# Patient Record
Sex: FEMALE | Race: WHITE | Employment: OTHER | URBAN - METROPOLITAN AREA
[De-identification: names, ages, dates, MRNs, and addresses within clinical notes are randomized per-mention and may not be internally consistent; named-entity substitution may affect disease eponyms.]

---

## 2021-03-31 ENCOUNTER — OFFICE VISIT (OUTPATIENT)
Dept: SURGERY | Age: 66
End: 2021-03-31
Payer: COMMERCIAL

## 2021-03-31 VITALS
SYSTOLIC BLOOD PRESSURE: 147 MMHG | WEIGHT: 227.22 LBS | DIASTOLIC BLOOD PRESSURE: 83 MMHG | OXYGEN SATURATION: 99 % | BODY MASS INDEX: 38.79 KG/M2 | HEART RATE: 70 BPM | HEIGHT: 64 IN

## 2021-03-31 DIAGNOSIS — E88.1: Primary | ICD-10-CM

## 2021-03-31 PROCEDURE — 99203 OFFICE O/P NEW LOW 30 MIN: CPT | Performed by: PLASTIC SURGERY

## 2021-03-31 RX ORDER — ROSUVASTATIN CALCIUM 10 MG/1
10 TABLET, COATED ORAL DAILY
COMMUNITY
Start: 2021-03-08

## 2021-03-31 RX ORDER — ASPIRIN 81 MG/1
81 TABLET ORAL DAILY
COMMUNITY

## 2021-03-31 NOTE — PROGRESS NOTES
History    Not on file   Social Needs    Financial resource strain: Not on file    Food insecurity     Worry: Not on file     Inability: Not on file    Transportation needs     Medical: Not on file     Non-medical: Not on file   Tobacco Use    Smoking status: Never Smoker    Smokeless tobacco: Never Used   Substance and Sexual Activity    Alcohol use: Not on file    Drug use: Not on file    Sexual activity: Not on file   Lifestyle    Physical activity     Days per week: Not on file     Minutes per session: Not on file    Stress: Not on file   Relationships    Social connections     Talks on phone: Not on file     Gets together: Not on file     Attends [employer] service: Not on file     Active member of club or organization: Not on file     Attends meetings of clubs or organizations: Not on file     Relationship status: Not on file    Intimate partner violence     Fear of current or ex partner: Not on file     Emotionally abused: Not on file     Physically abused: Not on file     Forced sexual activity: Not on file   Other Topics Concern    Not on file   Social History Narrative    Not on file     No family history on file. Physical Exam:   BP (!) 147/83   Pulse 70   Ht 5' 4\" (1.626 m)   Wt 227 lb 3.5 oz (103.1 kg)   SpO2 99%   BMI 39.00 kg/m²    Body mass index is 39 kg/m². Physical Exam   Nursing note and vitals reviewed. Constitutional: Oriented to person, place, and time. Appears well-developed and well-nourished. No distress. Head: Normocephalic and atraumatic. Eyes: Conjunctivae and EOM are normal.   Pulmonary/Chest: Effort normal. No respiratory distress. Neurological: Alert and oriented to person, place, and time. Skin: Excess skin and soft tissue over the mons pubis. Psychiatric: Normal mood and affect. Behavior is normal    Imaging:   [unfilled]        Impression/Plan:      Diagnosis Orders   1.  Cutaneous lipodystrophy       There is no problem list on file for this patient. Plan:  Patient with lipodystrophy versus edema mons pubis. The risk of mons plasty was discussed with patient detail. All questions were answered. ASPS consent mons pubis lift    GENERAL INFORMATION  Mons pubis lift is a surgical procedure to remove excess skin and fatty tissue from the mons pubis. Obese individuals who intend to lose weight should postpone all forms of body contouring surgery until they have reached a stable weight. ALTERNATIVE TREATMENTS  Alternative forms of management consist of not treating the areas of loose skin and fatty deposits. Liposuction may be a surgical alternative to if there is good skin tone and localized mons pubis fatty tissue. If you have lost your skin laxity as apparent by multiple stretch marks you will not be a good liposuction candidate. Diet and exercise programs may be of benefit in the overall reduction of excess body fat and contour improvement. Risks and potential complications are associated with alternative surgical forms of treatment. RISKS OF mons pubis lift SURGERY  Every surgical procedure involves a certain amount of risk and it is important that you understand these risks and the possible complications associated with them. In addition, every procedure has limitations. An individual's choice to undergo a surgical procedure is based on the comparison of the risk to potential benefit. Although the majority of patients do not experience these complications, you should discuss each of them with your plastic surgeon to make sure you completely understand all possible consequences of an abdominoplasty/panniculectomy. Bleeding- It is possible, though unusual, to experience a bleeding episode during or after surgery. Should post-operative bleeding occur, it may require an emergency treatment to drain the accumulated blood or blood transfusion. Intra-operative blood transfusions may be required.   Do not take any aspirin or anti-inflammatory medications for ten days before surgery, as this may increase the risk of bleeding. Non-prescription herbs and dietary supplements can increase the risk of surgical bleeding. Hematoma can occur at any time following injury. If blood transfusions are needed to treat blood loss, there is a risk of blood related infections such as hepatitis and the HIV (AIDS). Heparin medications that are used to prevent blood clots in veins can produce bleeding and decreased blood platelets. Infection - Infection is can occur after this surgery. Should an infection occur, treatment including antibiotics, hospitalization, or additional surgery may be necessary. There is a greater risk of infection when body contouring procedures are performed in conjunction with abdominal surgical procedures. Change in Skin Sensation- It is common to experience diminished (or loss) of skin sensation in areas that have had surgery. Diminished (or complete loss of skin sensation) may not totally resolve after an abdominoplasty/pannicular     Skin Contour Irregularities- Contour and shape irregularities and depressions may occur after mons pubis lift. Visible and palpable wrinkling of skin can occur. Residual skin irregularities at the ends of the incisions or dog ears are always a possibility as is skin pleating when there is excessive redundant skin. This may improve with time, or it can be surgically corrected. Major Wound Separation- Wounds may separate after surgery. Should this occur, additional treatment including surgery may be necessary. Skin Discoloration / Swelling- Bruising and swelling normally occurs following mons lift. The skin in or near the surgical site can appear either lighter or darker than surrounding skin. Although uncommon, swelling and skin discoloration may persist for long periods of time and, in rare situations, may be permanent.       Skin Sensitivity- Itching, tenderness, or exaggerated responses to hot or cold temperatures may occur after surgery. Usually this resolve during healing, but in some situations it may be chronic. Sutures- Most surgical techniques use deep sutures. You may notice these sutures after your surgery. Sutures may spontaneously poke through the skin, become visible or produce irritation that requires removal.    Damage to Deeper Structures- There is the potential for injury to deeper structures including nerves, blood vessels, muscles, and lungs (pneumothorax), or intra-abdominal injury can occure during any surgical procedure. The potential for this to occur varies according to the type of procedure being performed. Injury to deeper structures may be temporary or permanent. Fat Necrosis- Fatty tissue found deep in the skin might die. This may produce areas of firmness within the skin. Additional surgery to remove areas of fat necrosis may be necessary. There is the possibility of contour irregularities in the skin that may result from fat necrosis. Obesity: If you're BMI is greater than 30 you may have a higher chance of complications. This may include but not limited to wound healing and infections. Also, if you have other medical problems such as diabetes and hypertension it may affect your healing as well as your surgical results in bleeding. Pubic Distortion- There will be distortion of their labia and pubic area. Additional treatment including surgery may be necessary. Even with additional surgery she may never have symmetry. At times you may have painful intercourse. Scarring- All surgery leaves scars, some more visible than others. This surgery will leave large scars. Abnormal scars may occur within the skin and deeper tissues depending on your healing. Scars may be unattractive and of different color than surrounding skin.   Scar appearance may also vary within the same scar, exhibit contour variations or \"bunching\" due to the amount of excess skin. Scars may be asymmetrical (appear different between right and left side of the body). There is the possibility of visible marks in the skin from sutures. In some cases, scars may require surgical revision or treatment. Surgical Anesthesia- Both local and general anesthesia involve risk. There is the possibility of complications, injury, and even death from all forms of surgical anesthesia or sedation    Asymmetry- Symmetrical body appearance may not result from mons pubis lift factors such as skin tone, fatty deposits, skeletal prominence, and muscle tone may contribute to normal asymmetry in body features. Additional surgery may be necessary to attempt to attempt to improve asymmetry. Allergic Reactions- In some cases, local allergies to tape, suture material and glues, blood products, topical preparations or injected agents have been reported. Serious systemic reactions including shock (anaphylaxis) may occur to drugs used during surgery and prescription medications. Allergic reactions may require additional treatment. Delayed Healing- Wound disruption or delayed wound healing is possible. Some areas of the mons pubis may not heal normally and may take a long time to heal.  Some areas of skin or tissue may die. This may require frequent dressing changes or further surgery to remove the non-healed tissue. Smokers have a greater risk of skin loss and wound healing complications. Also, patient with certain systemic disease or taking certain medications are at increased risk. Also, infections under the pannus may affect your healing. Seroma- Fluid accumulations infrequently occur in between the skin and the mons pubis. This may require additional procedures for drainage of fluid. Shock- In rare circumstances, your surgical procedure can cause severe trauma, particularly when multiple or extensive procedures are performed.   Although serious complications are infrequent, infections or excessive fluid loss can lead to severe illness and even death. If surgical shock occurs, hospitalization and additional treatment would be necessary. Surgical Wetting Solutions-There is the possibility that large volumes of fluid containing dilute local anesthetic drugs and epinephrine that is injected into fatty deposits during surgery may contribute to fluid overload or systemic reaction to these medications. Additional treatment including hospitalization may be necessary. Persistent Swelling (Lymphedema)- Persistent swelling in the legs can occur following mons pubis lift    Pain- You will experience pain after your surgery. Pain of varying intensity and duration may occur and persist after panniculectomy. Chronic pain may occur every from nerves becoming trapped in scar tissue after panniculectomy. There may be numbness that can occur after a panniculectomy this could be temporary or permanent    Unsatisfactory Result- There is no guarantee or warranty expressed or implied, on the results that may be obtained. You may be disappointed with the results of panniculectomy surgery. This would include risks such as asymmetry, unsatisfactory or highly visible surgical scar location, unacceptable visible deformities, bunching and rippling in the skin near the suture lines or at the ends of the incisions (dog ears), poor healing, wound disruption, and loss of sensation. It may not be possible to correct or improve the effects of surgical scars. In some situations, it may not be possible to achieve optimal results with a single surgical procedure. Additional surgery may be required to improve results. Deep Venous Thrombosis, Cardiac and Pulmonary Complications- Surgery, especially longer procedures, may be associated with the formation of, or increase in, blood clots in the venous system.   Pulmonary complications may occur secondarily to both blood clots (pulmonary emboli), fat deposits (fat emboli) or partial collapse of the lungs after general anesthesia. Pulmonary and fat emboli can be life-threatening or fatal in some circumstances. Air travel, inactivity and other conditions may increase the incidence of blood clots traveling to the lungs causing a major blood clot that may result in death. It is important to discuss with your physician any past history of blood clots, swollen legs or the use of estrogen or birth control pills that may contribute to this condition. Cardiac complications are a risk with any surgery and anesthesia, even in patients without symptoms. Should any of these complications occur, you may require hospitalization and additional treatment. If you experience shortness of breath, chest pains, or unusual heart beats, seek medical attention immediately. ADDITIONAL ADVISORIES    Long-Term Results- Subsequent alterations in the appearance of your body may occur as the result of aging, sun exposure, weight loss, weight gain, pregnancy, menopause or other circumstances not related to your surgery. Metabolic Status of Massive Weight Loss Patients- Your personal metabolic status of blood chemistry and protein levels may be abnormal following massive weight loss and surgical procedures to make a patient lose weight. Individuals with abnormalities may be a risk for serious medical and surgical complications, including delayed wound healing, infection or even in rare cases, death. Body-Piercing Procedures- Individuals who currently wear body-piercing jewelry or are seeking to undergo body-piercing procedures must consider the possibility that an infection could develop anytime following this procedure. Treatment including antibiotics, hospitalization or additional surgery may be necessary.      Intimate Relations After Surgery- Surgery involves coagulating of blood vessels and increased activity of any kind may open these vessels leading to a bleed, or hematoma. Increased activity that increased your pulse or heart rate may cause additional bruising, swelling, and the need for return to surgery and control bleeding. It is wise to refrain from sexual activity until your physician states it is safe. Medications- There are many adverse reactions that occur as the result of taking over the counter, herbal, and/or prescription medications. Be sure to check with your physician about any drug interactions that may exist with medications that you are already taking. If you have an adverse reaction, stop the drugs immediately and call your plastic surgeon for further instructions. If the reaction is severe, go immediately to the nearest emergency room. When taking the prescribed pain medications after surgery, realize that they can affect your thought process and coordination. Do not drive, do not operate complex equipment, do not make any important decisions and do not drink any alcohol while taking these medications. Be sure to take your prescribed medication only as directed. If at blood thinners such as aspirin and Coumadin or Plavix etc. Is prescribed by a physician you must consult with the prescribing physician prior to stopping any of the blood thinners. Our focus is improvement rather than perfection. Complications or less than satisfactory results are sometimes unavoidable, may require additional surgery and often are stressful. Smoking, Second-Hand Smoke Exposure, Nicotine Products (Patch, Gum, Nasal Spray)-   Patients who are currently smoking, use tobacco products, or nicotine products (patch, gum, or nasal spray) are at a greater risk for significant surgical complications of skin dying, delayed healing, and additional scarring. Individuals exposed to second-hand smoke are also at potential risk for similar complications attributable to nicotine exposure.   Additionally, smokers may have a significant negative effect on anesthesia and recovery from anesthesia, with coughing and possibly increased bleeding. Individuals who are not exposed to tobacco smoke or nicotine-containing products have a significantly lower risk of this type of complication. It is important to refrain from smoking at least 6-8 weeks before surgery and I do not smoke for 8 weeks to 3 months after surgery. Post-bariatric patients: It is imperative that quit smoking at least 6-8 weeks before undergoing this procedure as it will adversely affect your outcome. ADDITIONAL SURGERY NECESSARY (RE-OPERATIONS)  There are many variable conditions that may influence the long-term result of surgery. Should complications occur, additional surgery or other treatments may be necessary. Secondary surgery may be necessary to obtain optimal results. Risks and complications can occur with any surgery, the risks cited are particularly associated with abdominoplasty/panniculectomy. Other complications and risks can occur but are even more uncommon. The practice of medicine and surgery is not an exact science. There is no guarantee or warranty expressed or implied, on the results that may be obtained. In some situations, it may not be possible to achieve optimal results with a single surgical procedure or even a multiple procedure. PATIENT COMPLIANCE   Follow all physician instructions carefully; this is essential for the success of your outcome. It is important that the surgical incisions are not subjected to excessive force, swelling, abrasion, or motion during the time of healing. Personal and vocational activity needs to be restricted. Protective dressings and drains should not be removed unless instructed by me. Successful post-operative function depends on both surgery and subsequent care. Physical activity that increases your pulse or heart rate may cause bruising, swelling, fluid accumulation and the need for return to surgery.   It is wise to refrain from intimate physical activities after surgery until your physician states it is safe. It is important that you participate in follow-up care, return for aftercare, and promote your recovery after surgery. FINANCIAL RESPONSIBILITIES  The cost of surgery involves several charges for the services provided. The total includes fees charged by your surgeon, the cost of surgical supplies, anesthesia, laboratory tests, and possible hospital charges, depending on where the surgery is performed. Depending on whether the cost of surgery is covered by an insurance plan, you will be responsible for necessary co-payments, deductibles, and charges not covered. The fees charged for this procedure do not include any potential future costs for additional procedures that you elect to have or require in order to revise, optimize, or complete your outcome. Additional costs may occur should complications develop from the surgery. Secondary surgery or hospital day-surgery charges involved with revision surgery will also be your responsibility. HEALTH INSURANCE  Most health insurance companies exclude coverage for cosmetic surgical operations any complications that might occur from surgery. Please carefully review your health insurance subscriber-information pamphlet or contact your insurance company for a detailed explanation of their policies for covering abdominoplasty/panniculectomy procedures. Most insurance plans may exclude coverage for secondary or revisionary surgery.     Electronically signed by:  Otho Frankel, MD 3/31/2021

## 2021-06-17 ENCOUNTER — TELEPHONE (OUTPATIENT)
Dept: SURGERY | Age: 66
End: 2021-06-17

## 2021-06-17 NOTE — TELEPHONE ENCOUNTER
Spoke with patient. Advised I have not hear of approval or denial.  I will let her know when Prec[facility] calls.

## 2021-06-30 ENCOUNTER — TELEPHONE (OUTPATIENT)
Dept: SURGERY | Age: 66
End: 2021-06-30

## 2021-06-30 DIAGNOSIS — E88.1: Primary | ICD-10-CM

## 2021-07-14 ENCOUNTER — PATIENT MESSAGE (OUTPATIENT)
Dept: SURGERY | Age: 66
End: 2021-07-14

## 2021-07-14 NOTE — TELEPHONE ENCOUNTER
Spoke to patient and cleared up all questions and concerns     She will be getting COVID test done at an outside facility due to her location and they will fax the result to us

## 2021-08-24 ENCOUNTER — TELEPHONE (OUTPATIENT)
Dept: FAMILY MEDICINE CLINIC | Age: 66
End: 2021-08-24

## 2021-08-24 NOTE — TELEPHONE ENCOUNTER
Patient called states she needs a cardiac clearance faxed to Dr Kristofer Killian [phone]) for the surgery.

## 2021-08-31 ENCOUNTER — TELEPHONE (OUTPATIENT)
Dept: SURGERY | Age: 66
End: 2021-08-31

## 2021-08-31 NOTE — TELEPHONE ENCOUNTER
Dr Swan Running office called. They received the Cardiac clearance paperwork for the pt upcoming 9/23/21 surgery [facility] w/incisional wound vac) with Dr Alicia Snell. Doctor would like to know the amount of time the patient will be under anesthesia so he can determine if more testing is needed for the clearance. The office can be reached at [phone] ext [phone]. If Marjory Cooks does does not answer, it is okay to leave it on voicemail.

## 2021-09-20 ENCOUNTER — HOSPITAL ENCOUNTER (OUTPATIENT)
Dept: LAB | Age: 66
Setting detail: SPECIMEN
Discharge: HOME | End: 2021-09-20
Payer: MEDICARE

## 2021-09-20 ENCOUNTER — TELEPHONE (OUTPATIENT)
Dept: SURGERY | Age: 66
End: 2021-09-20

## 2021-09-20 DIAGNOSIS — Z01.818 PREOP TESTING: Primary | ICD-10-CM

## 2021-09-20 PROCEDURE — U0003 INFECTIOUS AGENT DETECTION BY NUCLEIC ACID (DNA OR RNA); SEVERE ACUTE RESPIRATORY SYNDROME CORONAVIRUS 2 (SARS-COV-2) (CORONAVIRUS DISEASE [COVID-19]), AMPLIFIED PROBE TECHNIQUE, MAKING USE OF HIGH THROUGHPUT TECHNOLOGIES AS DESCRIBED BY CMS-2020-01-R: HCPCS

## 2021-09-20 PROCEDURE — U0005 INFEC AGEN DETEC AMPLI PROBE: HCPCS

## 2021-09-21 ENCOUNTER — TELEPHONE (OUTPATIENT)
Dept: FAMILY MEDICINE CLINIC | Age: 66
End: 2021-09-21

## 2021-09-21 LAB
SARS-COV-2: NORMAL
SARS-COV-2: NOT DETECTED
SOURCE: NORMAL

## 2021-09-21 NOTE — TELEPHONE ENCOUNTER
Pt call request to cancel  her surgery due to pt will like to los more weight before surgery pt consider to reschedule for 2022

## 2021-09-21 NOTE — TELEPHONE ENCOUNTER
Spoke to patient, she will come see Dr. Laci Toro again in February to see where she is in her weight loss

## 2022-02-16 ENCOUNTER — OFFICE VISIT (OUTPATIENT)
Dept: SURGERY | Age: 67
End: 2022-02-16
Payer: COMMERCIAL

## 2022-02-16 VITALS — RESPIRATION RATE: 12 BRPM | HEIGHT: 64 IN | OXYGEN SATURATION: 100 % | BODY MASS INDEX: 37.56 KG/M2 | WEIGHT: 220 LBS

## 2022-02-16 DIAGNOSIS — E88.1: Primary | ICD-10-CM

## 2022-02-16 PROCEDURE — 3017F COLORECTAL CA SCREEN DOC REV: CPT | Performed by: PLASTIC SURGERY

## 2022-02-16 PROCEDURE — 1036F TOBACCO NON-USER: CPT | Performed by: PLASTIC SURGERY

## 2022-02-16 PROCEDURE — 1123F ACP DISCUSS/DSCN MKR DOCD: CPT | Performed by: PLASTIC SURGERY

## 2022-02-16 PROCEDURE — G8400 PT W/DXA NO RESULTS DOC: HCPCS | Performed by: PLASTIC SURGERY

## 2022-02-16 PROCEDURE — G8417 CALC BMI ABV UP PARAM F/U: HCPCS | Performed by: PLASTIC SURGERY

## 2022-02-16 PROCEDURE — 99213 OFFICE O/P EST LOW 20 MIN: CPT | Performed by: PLASTIC SURGERY

## 2022-02-16 PROCEDURE — G8427 DOCREV CUR MEDS BY ELIG CLIN: HCPCS | Performed by: PLASTIC SURGERY

## 2022-02-16 PROCEDURE — 4040F PNEUMOC VAC/ADMIN/RCVD: CPT | Performed by: PLASTIC SURGERY

## 2022-02-16 PROCEDURE — G8484 FLU IMMUNIZE NO ADMIN: HCPCS | Performed by: PLASTIC SURGERY

## 2022-02-16 PROCEDURE — 1090F PRES/ABSN URINE INCON ASSESS: CPT | Performed by: PLASTIC SURGERY

## 2022-02-16 NOTE — PROGRESS NOTES
[address] [facility]  [address] [facility] [id number]       History and Physical     Chief Complaint   Patient presents with    Follow-up        HPI:   Arvind Cardona is a 73 y.o. female who presents discussed mons plasty. Patient status post abdominoplasty. Patient has excess skin over her mons pubis. The excess skin may be lipodystrophy however lymphedema of the mons cannot be completely excluded. Patient is here to discuss her options. Patient has discomfort due to rubbing of the underpants in the area. Medications:     Current Outpatient Medications   Medication Sig Dispense Refill    aspirin 81 MG EC tablet Take 81 mg by mouth daily      rosuvastatin (CRESTOR) 10 MG tablet Take 10 mg by mouth daily       No current facility-administered medications for this visit. Allergies: Allergies   Allergen Reactions    Brilliant Blue [facility]cf Anaphylaxis     Non-ionic dye    Iodides Anaphylaxis    Iodine Anaphylaxis     Review of Systems:   Constitutional: Negative for fever, chills, fatigue and unexpected weight change. HENT: Negative for hearing loss, sore throat and facial swelling. Eyes: Negative for pain and discharge. Respiratory: Negative for cough and shortness of breath. Cardiovascular: Negative for chest pain. Gastrointestinal: Negative for nausea, vomiting, diarrhea and constipation. Skin: History of irritation. .   Neurological: Negative for seizures, syncope and numbness. Hematological: Does not bruise/bleed easily. Psychiatric/Behavioral: Negative for behavioral problems. The patient is not nervous/anxious.       Past Medical History:   Diagnosis Date    Abdominal pannus     CAD (coronary artery disease)     Cancer ([facility] [address]     bcc skin/ multiple sites/ multiple excisions    Dyslipidemia     GERD (gastroesophageal reflux disease)     Glaucoma     Hyperlipidemia     Kidney stones     Lumbar radiculopathy     Obesity  Vitamin D deficiency      Past Surgical History:   Procedure Laterality Date    ABDOMINOPLASTY      ANKLE SURGERY Right     CATARACT REMOVAL WITH IMPLANT Bilateral 3310,3146    COLONOSCOPY      COSMETIC SURGERY      CYSTOSCOPY  2967,9817    EYE SURGERY Right 2017    retinal detached    LIPECTOMY      LITHOTRIPSY  6901,6383    x3 laser of stones/ stent placed    SKIN BIOPSY  2012    neck bcc    SKIN BIOPSY  2010    x2 neck BCC    SKIN BIOPSY      SCC/ actinic keratosis, cheek, forehead    SKIN BIOPSY  2013    BCC rt temporal    SKIN BIOPSY  10/2017     rt breast /left shoulder skin cancers     Social History     Socioeconomic History    Marital status:      Spouse name: Not on file    Number of children: Not on file    Years of education: Not on file    Highest education level: Not on file   Occupational History    Not on file   Tobacco Use    Smoking status: Former Smoker     Packs/day: 0.25     Years: 1.00     Pack years: 0.25     Start date: 0     Quit date:      Years since quittin.1    Smokeless tobacco: Never Used   Vaping Use    Vaping Use: Never used   Substance and Sexual Activity    Alcohol use: Yes     Comment: social    Drug use: Not Currently    Sexual activity: Not on file   Other Topics Concern    Not on file   Social History Narrative    Not on file     Social Determinants of Health     Financial Resource Strain:     Difficulty of Paying Living Expenses: Not on file   Food Insecurity:     Worried About [address] in the Last Year: Not on file    [address] in the Last Year: Not on file   Transportation Needs:     Lack of Transportation (Medical): Not on file    Lack of Transportation (Non-Medical):  Not on file   Physical Activity:     Days of Exercise per Week: Not on file    Minutes of Exercise per Session: Not on file   Stress:     Feeling of Stress : Not on file   Social Connections:     Frequency of Communication with Friends and Family: Not on file    Frequency of Social Gatherings with Friends and Family: Not on file    Attends Nondenominational Services: Not on file    Active Member of Clubs or Organizations: Not on file    Attends Club or Organization Meetings: Not on file    Marital Status: Not on file   Intimate Partner Violence:     Fear of Current or Ex-Partner: Not on file    Emotionally Abused: Not on file    Physically Abused: Not on file    Sexually Abused: Not on file   Housing Stability:     Unable to Pay for Housing in the Last Year: Not on file    Number of [place] in the Last Year: Not on file    Unstable Housing in the Last Year: Not on file     No family history on file. Physical Exam:   Resp 12   Ht 5' 4\" (1.626 m)   Wt 220 lb (99.8 kg)   SpO2 100%   BMI 37.76 kg/m²    Body mass index is 37.76 kg/m². Physical Exam   Nursing note and vitals reviewed. Constitutional: Oriented to person, place, and time. Appears well-developed and well-nourished. No distress. Head: Normocephalic and atraumatic. Eyes: Conjunctivae and EOM are normal.   Pulmonary/Chest: Effort normal. No respiratory distress. Neurological: Alert and oriented to person, place, and time. Skin: Excess skin and soft tissue over the mons pubis. Psychiatric: Normal mood and affect. Behavior is normal    Imaging:                       Impression/Plan:      Diagnosis Orders   1. Cutaneous lipodystrophy       There is no problem list on file for this patient. Plan:  Patient with lipodystrophy versus edema mons pubis. The risk of mons plasty was discussed with patient detail. All questions were answered. ASPS consent mons pubis lift    GENERAL INFORMATION  Mons pubis lift is a surgical procedure to remove excess skin and fatty tissue from the mons pubis. Obese individuals who intend to lose weight should postpone all forms of body contouring surgery until they have reached a stable weight.     ALTERNATIVE TREATMENTS  Alternative forms of management consist of not treating the areas of loose skin and fatty deposits. Liposuction may be a surgical alternative to if there is good skin tone and localized mons pubis fatty tissue. If you have lost your skin laxity as apparent by multiple stretch marks you will not be a good liposuction candidate. Diet and exercise programs may be of benefit in the overall reduction of excess body fat and contour improvement. Risks and potential complications are associated with alternative surgical forms of treatment. RISKS OF mons pubis lift SURGERY  Every surgical procedure involves a certain amount of risk and it is important that you understand these risks and the possible complications associated with them. In addition, every procedure has limitations. An individual's choice to undergo a surgical procedure is based on the comparison of the risk to potential benefit. Although the majority of patients do not experience these complications, you should discuss each of them with your plastic surgeon to make sure you completely understand all possible consequences of an abdominoplasty/panniculectomy. Bleeding- It is possible, though unusual, to experience a bleeding episode during or after surgery. Should post-operative bleeding occur, it may require an emergency treatment to drain the accumulated blood or blood transfusion. Intra-operative blood transfusions may be required. Do not take any aspirin or anti-inflammatory medications for ten days before surgery, as this may increase the risk of bleeding. Non-prescription herbs and dietary supplements can increase the risk of surgical bleeding. Hematoma can occur at any time following injury. If blood transfusions are needed to treat blood loss, there is a risk of blood related infections such as hepatitis and the HIV (AIDS).   Heparin medications that are used to prevent blood clots in veins can produce bleeding and appearance may not result from mons pubis lift factors such as skin tone, fatty deposits, skeletal prominence, and muscle tone may contribute to normal asymmetry in body features. Additional surgery may be necessary to attempt to attempt to improve asymmetry. Allergic Reactions- In some cases, local allergies to tape, suture material and glues, blood products, topical preparations or injected agents have been reported. Serious systemic reactions including shock (anaphylaxis) may occur to drugs used during surgery and prescription medications. Allergic reactions may require additional treatment. Delayed Healing- Wound disruption or delayed wound healing is possible. Some areas of the mons pubis may not heal normally and may take a long time to heal.  Some areas of skin or tissue may die. This may require frequent dressing changes or further surgery to remove the non-healed tissue. Smokers have a greater risk of skin loss and wound healing complications. Also, patient with certain systemic disease or taking certain medications are at increased risk. Also, infections under the pannus may affect your healing. Seroma- Fluid accumulations infrequently occur in between the skin and the mons pubis. This may require additional procedures for drainage of fluid. Shock- In rare circumstances, your surgical procedure can cause severe trauma, particularly when multiple or extensive procedures are performed. Although serious complications are infrequent, infections or excessive fluid loss can lead to severe illness and even death. If surgical shock occurs, hospitalization and additional treatment would be necessary. Surgical Wetting Solutions-There is the possibility that large volumes of fluid containing dilute local anesthetic drugs and epinephrine that is injected into fatty deposits during surgery may contribute to fluid overload or systemic reaction to these medications.   Additional treatment including hospitalization may be necessary. Persistent Swelling (Lymphedema)- Persistent swelling in the legs can occur following mons pubis lift    Pain- You will experience pain after your surgery. Pain of varying intensity and duration may occur and persist after panniculectomy. Chronic pain may occur every from nerves becoming trapped in scar tissue after panniculectomy. There may be numbness that can occur after a panniculectomy this could be temporary or permanent    Unsatisfactory Result- There is no guarantee or warranty expressed or implied, on the results that may be obtained. You may be disappointed with the results of panniculectomy surgery. This would include risks such as asymmetry, unsatisfactory or highly visible surgical scar location, unacceptable visible deformities, bunching and rippling in the skin near the suture lines or at the ends of the incisions (dog ears), poor healing, wound disruption, and loss of sensation. It may not be possible to correct or improve the effects of surgical scars. In some situations, it may not be possible to achieve optimal results with a single surgical procedure. Additional surgery may be required to improve results. Deep Venous Thrombosis, Cardiac and Pulmonary Complications- Surgery, especially longer procedures, may be associated with the formation of, or increase in, blood clots in the venous system. Pulmonary complications may occur secondarily to both blood clots (pulmonary emboli), fat deposits (fat emboli) or partial collapse of the lungs after general anesthesia. Pulmonary and fat emboli can be life-threatening or fatal in some circumstances. Air travel, inactivity and other conditions may increase the incidence of blood clots traveling to the lungs causing a major blood clot that may result in death.   It is important to discuss with your physician any past history of blood clots, swollen legs or the use of estrogen or birth control pills that may contribute to this condition. Cardiac complications are a risk with any surgery and anesthesia, even in patients without symptoms. Should any of these complications occur, you may require hospitalization and additional treatment. If you experience shortness of breath, chest pains, or unusual heart beats, seek medical attention immediately. ADDITIONAL ADVISORIES    Long-Term Results- Subsequent alterations in the appearance of your body may occur as the result of aging, sun exposure, weight loss, weight gain, pregnancy, menopause or other circumstances not related to your surgery. Metabolic Status of Massive Weight Loss Patients- Your personal metabolic status of blood chemistry and protein levels may be abnormal following massive weight loss and surgical procedures to make a patient lose weight. Individuals with abnormalities may be a risk for serious medical and surgical complications, including delayed wound healing, infection or even in rare cases, death. Body-Piercing Procedures- Individuals who currently wear body-piercing jewelry or are seeking to undergo body-piercing procedures must consider the possibility that an infection could develop anytime following this procedure. Treatment including antibiotics, hospitalization or additional surgery may be necessary. Intimate Relations After Surgery- Surgery involves coagulating of blood vessels and increased activity of any kind may open these vessels leading to a bleed, or hematoma. Increased activity that increased your pulse or heart rate may cause additional bruising, swelling, and the need for return to surgery and control bleeding. It is wise to refrain from sexual activity until your physician states it is safe. Medications- There are many adverse reactions that occur as the result of taking over the counter, herbal, and/or prescription medications.   Be sure to check with your physician about any drug interactions that may exist with medications that you are already taking. If you have an adverse reaction, stop the drugs immediately and call your plastic surgeon for further instructions. If the reaction is severe, go immediately to the nearest emergency room. When taking the prescribed pain medications after surgery, realize that they can affect your thought process and coordination. Do not drive, do not operate complex equipment, do not make any important decisions and do not drink any alcohol while taking these medications. Be sure to take your prescribed medication only as directed. If at blood thinners such as aspirin and Coumadin or Plavix etc. Is prescribed by a physician you must consult with the prescribing physician prior to stopping any of the blood thinners. Our focus is improvement rather than perfection. Complications or less than satisfactory results are sometimes unavoidable, may require additional surgery and often are stressful. Smoking, Second-Hand Smoke Exposure, Nicotine Products (Patch, Gum, Nasal Spray)-   Patients who are currently smoking, use tobacco products, or nicotine products (patch, gum, or nasal spray) are at a greater risk for significant surgical complications of skin dying, delayed healing, and additional scarring. Individuals exposed to second-hand smoke are also at potential risk for similar complications attributable to nicotine exposure. Additionally, smokers may have a significant negative effect on anesthesia and recovery from anesthesia, with coughing and possibly increased bleeding. Individuals who are not exposed to tobacco smoke or nicotine-containing products have a significantly lower risk of this type of complication. It is important to refrain from smoking at least 6-8 weeks before surgery and I do not smoke for 8 weeks to 3 months after surgery. Post-bariatric patients:  It is imperative that quit smoking at least 6-8 weeks before undergoing this procedure as it will adversely affect your outcome. ADDITIONAL SURGERY NECESSARY (RE-OPERATIONS)  There are many variable conditions that may influence the long-term result of surgery. Should complications occur, additional surgery or other treatments may be necessary. Secondary surgery may be necessary to obtain optimal results. Risks and complications can occur with any surgery, the risks cited are particularly associated with abdominoplasty/panniculectomy. Other complications and risks can occur but are even more uncommon. The practice of medicine and surgery is not an exact science. There is no guarantee or warranty expressed or implied, on the results that may be obtained. In some situations, it may not be possible to achieve optimal results with a single surgical procedure or even a multiple procedure. PATIENT COMPLIANCE   Follow all physician instructions carefully; this is essential for the success of your outcome. It is important that the surgical incisions are not subjected to excessive force, swelling, abrasion, or motion during the time of healing. Personal and vocational activity needs to be restricted. Protective dressings and drains should not be removed unless instructed by me. Successful post-operative function depends on both surgery and subsequent care. Physical activity that increases your pulse or heart rate may cause bruising, swelling, fluid accumulation and the need for return to surgery. It is wise to refrain from intimate physical activities after surgery until your physician states it is safe. It is important that you participate in follow-up care, return for aftercare, and promote your recovery after surgery. FINANCIAL RESPONSIBILITIES  The cost of surgery involves several charges for the services provided.   The total includes fees charged by your surgeon, the cost of surgical supplies, anesthesia, laboratory tests, and possible hospital charges, depending on where the surgery is performed. Depending on whether the cost of surgery is covered by an insurance plan, you will be responsible for necessary co-payments, deductibles, and charges not covered. The fees charged for this procedure do not include any potential future costs for additional procedures that you elect to have or require in order to revise, optimize, or complete your outcome. Additional costs may occur should complications develop from the surgery. Secondary surgery or hospital day-surgery charges involved with revision surgery will also be your responsibility. HEALTH INSURANCE  Most health insurance companies exclude coverage for cosmetic surgical operations any complications that might occur from surgery. Please carefully review your health insurance subscriber-information pamphlet or contact your insurance company for a detailed explanation of their policies for covering abdominoplasty/panniculectomy procedures. Most insurance plans may exclude coverage for secondary or revisionary surgery.     Electronically signed by:  Abdoul García MD 2/16/2022

## 2022-02-17 ENCOUNTER — TELEPHONE (OUTPATIENT)
Dept: SURGERY | Age: 67
End: 2022-02-17

## 2022-02-17 NOTE — TELEPHONE ENCOUNTER
Surgery Scheduled/Dr. Valentino Simmer  Monsplasty  Incisional wound vac  7/21/22   9:00 am   [facility]    PAT - Phone call    COVID testing - will send script to have done on July 17    Patient notified by phone/mail

## 2022-02-19 ENCOUNTER — PATIENT MESSAGE (OUTPATIENT)
Dept: SURGERY | Age: 67
End: 2022-02-19

## 2022-02-21 DIAGNOSIS — E88.1: Primary | ICD-10-CM

## 2022-04-20 ENCOUNTER — TELEPHONE (OUTPATIENT)
Dept: SURGERY | Age: 67
End: 2022-04-20

## 2022-04-29 ENCOUNTER — PREP FOR PROCEDURE (OUTPATIENT)
Dept: SURGERY | Age: 67
End: 2022-04-29

## 2022-04-29 DIAGNOSIS — Z01.818 PREOP TESTING: Primary | ICD-10-CM

## 2022-04-29 RX ORDER — SODIUM CHLORIDE 0.9 % (FLUSH) 0.9 %
5-40 SYRINGE (ML) INJECTION EVERY 12 HOURS SCHEDULED
Status: CANCELLED | OUTPATIENT
Start: 2022-04-29

## 2022-04-29 RX ORDER — SODIUM CHLORIDE 9 MG/ML
INJECTION, SOLUTION INTRAVENOUS PRN
Status: CANCELLED | OUTPATIENT
Start: 2022-04-29

## 2022-04-29 RX ORDER — SODIUM CHLORIDE 0.9 % (FLUSH) 0.9 %
5-40 SYRINGE (ML) INJECTION PRN
Status: CANCELLED | OUTPATIENT
Start: 2022-04-29

## 2022-04-29 RX ORDER — ENOXAPARIN SODIUM 100 MG/ML
30 INJECTION SUBCUTANEOUS ONCE
Status: CANCELLED | OUTPATIENT
Start: 2022-04-29 | End: 2022-04-29

## 2022-07-11 ENCOUNTER — PATIENT MESSAGE (OUTPATIENT)
Dept: SURGERY | Age: 67
End: 2022-07-11

## 2022-07-11 NOTE — TELEPHONE ENCOUNTER
Emailed patient order for CBC as she lives in [facility], [facility]. She will have done in [facility].

## 2022-07-12 DIAGNOSIS — Z98.890 S/P PANNICULECTOMY: Primary | ICD-10-CM

## 2022-07-12 DIAGNOSIS — Z98.890 S/P PANNICULECTOMY: ICD-10-CM

## 2022-07-15 ENCOUNTER — ANESTHESIA EVENT (OUTPATIENT)
Dept: OPERATING ROOM | Age: 67
End: 2022-07-15
Payer: COMMERCIAL

## 2022-07-21 ENCOUNTER — ANESTHESIA (OUTPATIENT)
Dept: OPERATING ROOM | Age: 67
End: 2022-07-21
Payer: COMMERCIAL

## 2022-07-21 ENCOUNTER — HOSPITAL ENCOUNTER (OUTPATIENT)
Age: 67
Discharge: HOME | End: 2022-07-22
Attending: PLASTIC SURGERY | Admitting: PLASTIC SURGERY
Payer: COMMERCIAL

## 2022-07-21 DIAGNOSIS — E88.1: ICD-10-CM

## 2022-07-21 PROCEDURE — 7100000000 HC PACU RECOVERY - FIRST 15 MIN: Performed by: PLASTIC SURGERY

## 2022-07-21 PROCEDURE — 2500000003 HC RX 250 WO HCPCS: Performed by: ANESTHESIOLOGY

## 2022-07-21 PROCEDURE — 6360000002 HC RX W HCPCS: Performed by: PLASTIC SURGERY

## 2022-07-21 PROCEDURE — 3700000001 HC ADD 15 MINUTES (ANESTHESIA): Performed by: PLASTIC SURGERY

## 2022-07-21 PROCEDURE — 2580000003 HC RX 258: Performed by: ANESTHESIOLOGY

## 2022-07-21 PROCEDURE — 2500000003 HC RX 250 WO HCPCS

## 2022-07-21 PROCEDURE — 2720000010 HC SURG SUPPLY STERILE: Performed by: PLASTIC SURGERY

## 2022-07-21 PROCEDURE — 3600000002 HC SURGERY LEVEL 2 BASE: Performed by: PLASTIC SURGERY

## 2022-07-21 PROCEDURE — 2500000003 HC RX 250 WO HCPCS: Performed by: PLASTIC SURGERY

## 2022-07-21 PROCEDURE — 3700000000 HC ANESTHESIA ATTENDED CARE: Performed by: PLASTIC SURGERY

## 2022-07-21 PROCEDURE — 3600000012 HC SURGERY LEVEL 2 ADDTL 15MIN: Performed by: PLASTIC SURGERY

## 2022-07-21 PROCEDURE — 7100000001 HC PACU RECOVERY - ADDTL 15 MIN: Performed by: PLASTIC SURGERY

## 2022-07-21 PROCEDURE — 6370000000 HC RX 637 (ALT 250 FOR IP)

## 2022-07-21 PROCEDURE — 6370000000 HC RX 637 (ALT 250 FOR IP): Performed by: PLASTIC SURGERY

## 2022-07-21 PROCEDURE — 2580000003 HC RX 258: Performed by: PLASTIC SURGERY

## 2022-07-21 PROCEDURE — 2709999900 HC NON-CHARGEABLE SUPPLY: Performed by: PLASTIC SURGERY

## 2022-07-21 PROCEDURE — 6360000002 HC RX W HCPCS: Performed by: NURSE ANESTHETIST, CERTIFIED REGISTERED

## 2022-07-21 PROCEDURE — 6360000002 HC RX W HCPCS

## 2022-07-21 PROCEDURE — 15839 EXC EXCESSIVE SKN OTHER AREA: CPT | Performed by: PLASTIC SURGERY

## 2022-07-21 PROCEDURE — 97607 NEG PRS WND THR NDME<=50SQCM: CPT | Performed by: PLASTIC SURGERY

## 2022-07-21 PROCEDURE — A4217 STERILE WATER/SALINE, 500 ML: HCPCS | Performed by: PLASTIC SURGERY

## 2022-07-21 PROCEDURE — 6360000002 HC RX W HCPCS: Performed by: ANESTHESIOLOGY

## 2022-07-21 PROCEDURE — 88304 TISSUE EXAM BY PATHOLOGIST: CPT

## 2022-07-21 RX ORDER — LIDOCAINE HYDROCHLORIDE 10 MG/ML
INJECTION, SOLUTION EPIDURAL; INFILTRATION; INTRACAUDAL; PERINEURAL
Status: DISCONTINUED
Start: 2022-07-21 | End: 2022-07-21

## 2022-07-21 RX ORDER — OXYCODONE HYDROCHLORIDE AND ACETAMINOPHEN 5; 325 MG/1; MG/1
1 TABLET ORAL EVERY 4 HOURS PRN
Qty: 42 TABLET | Refills: 0 | Status: SHIPPED | OUTPATIENT
Start: 2022-07-21 | End: 2022-07-28

## 2022-07-21 RX ORDER — ONDANSETRON 2 MG/ML
4 INJECTION INTRAMUSCULAR; INTRAVENOUS EVERY 6 HOURS PRN
Status: DISCONTINUED | OUTPATIENT
Start: 2022-07-21 | End: 2022-07-22 | Stop reason: HOSPADM

## 2022-07-21 RX ORDER — OXYCODONE HYDROCHLORIDE 5 MG/1
10 TABLET ORAL EVERY 4 HOURS PRN
Status: DISCONTINUED | OUTPATIENT
Start: 2022-07-21 | End: 2022-07-22 | Stop reason: HOSPADM

## 2022-07-21 RX ORDER — ROSUVASTATIN CALCIUM 20 MG/1
10 TABLET, COATED ORAL DAILY
Status: DISCONTINUED | OUTPATIENT
Start: 2022-07-22 | End: 2022-07-22 | Stop reason: HOSPADM

## 2022-07-21 RX ORDER — GABAPENTIN 100 MG/1
100 CAPSULE ORAL 3 TIMES DAILY
Qty: 30 CAPSULE | Refills: 0 | Status: SHIPPED | OUTPATIENT
Start: 2022-07-21 | End: 2022-07-31

## 2022-07-21 RX ORDER — LIDOCAINE HYDROCHLORIDE 10 MG/ML
INJECTION, SOLUTION EPIDURAL; INFILTRATION; INTRACAUDAL; PERINEURAL PRN
Status: DISCONTINUED | OUTPATIENT
Start: 2022-07-21 | End: 2022-07-21 | Stop reason: SDUPTHER

## 2022-07-21 RX ORDER — DIPHENHYDRAMINE HYDROCHLORIDE 50 MG/ML
12.5 INJECTION INTRAMUSCULAR; INTRAVENOUS
Status: DISCONTINUED | OUTPATIENT
Start: 2022-07-21 | End: 2022-07-21 | Stop reason: HOSPADM

## 2022-07-21 RX ORDER — SCOLOPAMINE TRANSDERMAL SYSTEM 1 MG/1
1 PATCH, EXTENDED RELEASE TRANSDERMAL ONCE
Status: DISCONTINUED | OUTPATIENT
Start: 2022-07-21 | End: 2022-07-22 | Stop reason: HOSPADM

## 2022-07-21 RX ORDER — BACLOFEN 10 MG/1
10 TABLET ORAL 3 TIMES DAILY
Qty: 30 TABLET | Refills: 0 | Status: SHIPPED | OUTPATIENT
Start: 2022-07-21 | End: 2022-07-31

## 2022-07-21 RX ORDER — OXYCODONE HYDROCHLORIDE 5 MG/1
5 TABLET ORAL EVERY 4 HOURS PRN
Status: DISCONTINUED | OUTPATIENT
Start: 2022-07-21 | End: 2022-07-22 | Stop reason: HOSPADM

## 2022-07-21 RX ORDER — PROMETHAZINE HYDROCHLORIDE 25 MG/ML
6.25 INJECTION, SOLUTION INTRAMUSCULAR; INTRAVENOUS EVERY 5 MIN PRN
Status: DISCONTINUED | OUTPATIENT
Start: 2022-07-21 | End: 2022-07-21 | Stop reason: HOSPADM

## 2022-07-21 RX ORDER — GENTAMICIN SULFATE 40 MG/ML
INJECTION, SOLUTION INTRAMUSCULAR; INTRAVENOUS
Status: DISCONTINUED
Start: 2022-07-21 | End: 2022-07-21

## 2022-07-21 RX ORDER — ONDANSETRON 2 MG/ML
INJECTION INTRAMUSCULAR; INTRAVENOUS PRN
Status: DISCONTINUED | OUTPATIENT
Start: 2022-07-21 | End: 2022-07-21 | Stop reason: SDUPTHER

## 2022-07-21 RX ORDER — DEXAMETHASONE SODIUM PHOSPHATE 10 MG/ML
INJECTION, SOLUTION INTRAMUSCULAR; INTRAVENOUS PRN
Status: DISCONTINUED | OUTPATIENT
Start: 2022-07-21 | End: 2022-07-21 | Stop reason: SDUPTHER

## 2022-07-21 RX ORDER — BUPIVACAINE HYDROCHLORIDE AND EPINEPHRINE 2.5; 5 MG/ML; UG/ML
INJECTION, SOLUTION EPIDURAL; INFILTRATION; INTRACAUDAL; PERINEURAL PRN
Status: DISCONTINUED | OUTPATIENT
Start: 2022-07-21 | End: 2022-07-21 | Stop reason: ALTCHOICE

## 2022-07-21 RX ORDER — BUPIVACAINE HYDROCHLORIDE AND EPINEPHRINE 2.5; 5 MG/ML; UG/ML
INJECTION, SOLUTION INFILTRATION; PERINEURAL
Status: DISCONTINUED
Start: 2022-07-21 | End: 2022-07-21

## 2022-07-21 RX ORDER — SODIUM CHLORIDE 9 MG/ML
INJECTION, SOLUTION INTRAVENOUS CONTINUOUS
Status: DISCONTINUED | OUTPATIENT
Start: 2022-07-21 | End: 2022-07-21

## 2022-07-21 RX ORDER — MIDAZOLAM HYDROCHLORIDE 1 MG/ML
INJECTION INTRAMUSCULAR; INTRAVENOUS PRN
Status: DISCONTINUED | OUTPATIENT
Start: 2022-07-21 | End: 2022-07-21 | Stop reason: SDUPTHER

## 2022-07-21 RX ORDER — PROPOFOL 10 MG/ML
INJECTION, EMULSION INTRAVENOUS PRN
Status: DISCONTINUED | OUTPATIENT
Start: 2022-07-21 | End: 2022-07-21 | Stop reason: SDUPTHER

## 2022-07-21 RX ORDER — ONDANSETRON 2 MG/ML
INJECTION INTRAMUSCULAR; INTRAVENOUS
Status: COMPLETED
Start: 2022-07-21 | End: 2022-07-21

## 2022-07-21 RX ORDER — GLYCOPYRROLATE 1 MG/5 ML
SYRINGE (ML) INTRAVENOUS PRN
Status: DISCONTINUED | OUTPATIENT
Start: 2022-07-21 | End: 2022-07-21 | Stop reason: SDUPTHER

## 2022-07-21 RX ORDER — SODIUM CHLORIDE 0.9 % (FLUSH) 0.9 %
5-40 SYRINGE (ML) INJECTION PRN
Status: DISCONTINUED | OUTPATIENT
Start: 2022-07-21 | End: 2022-07-21 | Stop reason: HOSPADM

## 2022-07-21 RX ORDER — CEPHALEXIN 500 MG/1
500 CAPSULE ORAL 4 TIMES DAILY
Qty: 40 CAPSULE | Refills: 0 | Status: SHIPPED | OUTPATIENT
Start: 2022-07-21 | End: 2022-07-31

## 2022-07-21 RX ORDER — PHENYLEPHRINE HCL IN 0.9% NACL 1 MG/10 ML
SYRINGE (ML) INTRAVENOUS PRN
Status: DISCONTINUED | OUTPATIENT
Start: 2022-07-21 | End: 2022-07-21 | Stop reason: SDUPTHER

## 2022-07-21 RX ORDER — ENOXAPARIN SODIUM 100 MG/ML
INJECTION SUBCUTANEOUS
Status: COMPLETED
Start: 2022-07-21 | End: 2022-07-21

## 2022-07-21 RX ORDER — SODIUM CHLORIDE 0.9 % (FLUSH) 0.9 %
5-40 SYRINGE (ML) INJECTION EVERY 12 HOURS SCHEDULED
Status: DISCONTINUED | OUTPATIENT
Start: 2022-07-21 | End: 2022-07-21 | Stop reason: HOSPADM

## 2022-07-21 RX ORDER — FENTANYL CITRATE 50 UG/ML
INJECTION, SOLUTION INTRAMUSCULAR; INTRAVENOUS PRN
Status: DISCONTINUED | OUTPATIENT
Start: 2022-07-21 | End: 2022-07-21 | Stop reason: SDUPTHER

## 2022-07-21 RX ORDER — LIDOCAINE HYDROCHLORIDE 10 MG/ML
INJECTION, SOLUTION EPIDURAL; INFILTRATION; INTRACAUDAL; PERINEURAL
Status: DISCONTINUED
Start: 2022-07-21 | End: 2022-07-21 | Stop reason: WASHOUT

## 2022-07-21 RX ORDER — SCOLOPAMINE TRANSDERMAL SYSTEM 1 MG/1
PATCH, EXTENDED RELEASE TRANSDERMAL
Status: COMPLETED
Start: 2022-07-21 | End: 2022-07-21

## 2022-07-21 RX ORDER — ASPIRIN 81 MG/1
81 TABLET ORAL DAILY
Status: DISCONTINUED | OUTPATIENT
Start: 2022-07-22 | End: 2022-07-22 | Stop reason: HOSPADM

## 2022-07-21 RX ORDER — MORPHINE SULFATE 2 MG/ML
INJECTION, SOLUTION INTRAMUSCULAR; INTRAVENOUS
Status: COMPLETED
Start: 2022-07-21 | End: 2022-07-21

## 2022-07-21 RX ORDER — SODIUM CHLORIDE 9 MG/ML
INJECTION, SOLUTION INTRAVENOUS PRN
Status: DISCONTINUED | OUTPATIENT
Start: 2022-07-21 | End: 2022-07-22 | Stop reason: HOSPADM

## 2022-07-21 RX ORDER — SODIUM CHLORIDE 0.9 % (FLUSH) 0.9 %
5-40 SYRINGE (ML) INJECTION EVERY 12 HOURS SCHEDULED
Status: DISCONTINUED | OUTPATIENT
Start: 2022-07-21 | End: 2022-07-22 | Stop reason: HOSPADM

## 2022-07-21 RX ORDER — VANCOMYCIN HYDROCHLORIDE 1 G/20ML
INJECTION, POWDER, LYOPHILIZED, FOR SOLUTION INTRAVENOUS
Status: DISCONTINUED
Start: 2022-07-21 | End: 2022-07-21

## 2022-07-21 RX ORDER — PROMETHAZINE HYDROCHLORIDE 25 MG/1
12.5 TABLET ORAL EVERY 6 HOURS PRN
Status: DISCONTINUED | OUTPATIENT
Start: 2022-07-21 | End: 2022-07-22 | Stop reason: HOSPADM

## 2022-07-21 RX ORDER — MIDAZOLAM HYDROCHLORIDE 2 MG/2ML
2 INJECTION, SOLUTION INTRAMUSCULAR; INTRAVENOUS
Status: DISCONTINUED | OUTPATIENT
Start: 2022-07-21 | End: 2022-07-21 | Stop reason: HOSPADM

## 2022-07-21 RX ORDER — SODIUM CHLORIDE 9 MG/ML
INJECTION, SOLUTION INTRAVENOUS PRN
Status: DISCONTINUED | OUTPATIENT
Start: 2022-07-21 | End: 2022-07-21 | Stop reason: HOSPADM

## 2022-07-21 RX ORDER — PROMETHAZINE HYDROCHLORIDE 25 MG/ML
6.25 INJECTION, SOLUTION INTRAMUSCULAR; INTRAVENOUS EVERY 6 HOURS PRN
Status: DISCONTINUED | OUTPATIENT
Start: 2022-07-21 | End: 2022-07-22 | Stop reason: RX

## 2022-07-21 RX ORDER — ONDANSETRON 2 MG/ML
4 INJECTION INTRAMUSCULAR; INTRAVENOUS
Status: COMPLETED | OUTPATIENT
Start: 2022-07-21 | End: 2022-07-21

## 2022-07-21 RX ORDER — PROMETHAZINE HYDROCHLORIDE 25 MG/ML
INJECTION, SOLUTION INTRAMUSCULAR; INTRAVENOUS
Status: COMPLETED
Start: 2022-07-21 | End: 2022-07-21

## 2022-07-21 RX ORDER — SODIUM CHLORIDE 9 MG/ML
25 INJECTION, SOLUTION INTRAVENOUS PRN
Status: DISCONTINUED | OUTPATIENT
Start: 2022-07-21 | End: 2022-07-21 | Stop reason: HOSPADM

## 2022-07-21 RX ORDER — LABETALOL HYDROCHLORIDE 5 MG/ML
10 INJECTION, SOLUTION INTRAVENOUS
Status: DISCONTINUED | OUTPATIENT
Start: 2022-07-21 | End: 2022-07-21 | Stop reason: HOSPADM

## 2022-07-21 RX ORDER — MORPHINE SULFATE 2 MG/ML
2 INJECTION, SOLUTION INTRAMUSCULAR; INTRAVENOUS EVERY 5 MIN PRN
Status: DISCONTINUED | OUTPATIENT
Start: 2022-07-21 | End: 2022-07-21 | Stop reason: HOSPADM

## 2022-07-21 RX ORDER — MEPERIDINE HYDROCHLORIDE 50 MG/ML
12.5 INJECTION INTRAMUSCULAR; INTRAVENOUS; SUBCUTANEOUS EVERY 5 MIN PRN
Status: DISCONTINUED | OUTPATIENT
Start: 2022-07-21 | End: 2022-07-21 | Stop reason: HOSPADM

## 2022-07-21 RX ORDER — IPRATROPIUM BROMIDE AND ALBUTEROL SULFATE 2.5; .5 MG/3ML; MG/3ML
1 SOLUTION RESPIRATORY (INHALATION)
Status: DISCONTINUED | OUTPATIENT
Start: 2022-07-21 | End: 2022-07-21 | Stop reason: HOSPADM

## 2022-07-21 RX ORDER — GABAPENTIN 100 MG/1
100 CAPSULE ORAL DAILY
Qty: 90 CAPSULE | Refills: 3 | Status: SHIPPED | OUTPATIENT
Start: 2022-07-21 | End: 2022-07-21 | Stop reason: SDUPTHER

## 2022-07-21 RX ORDER — ENOXAPARIN SODIUM 100 MG/ML
30 INJECTION SUBCUTANEOUS ONCE
Status: DISCONTINUED | OUTPATIENT
Start: 2022-07-21 | End: 2022-07-21 | Stop reason: HOSPADM

## 2022-07-21 RX ORDER — OXYCODONE HYDROCHLORIDE AND ACETAMINOPHEN 5; 325 MG/1; MG/1
2 TABLET ORAL
Status: DISCONTINUED | OUTPATIENT
Start: 2022-07-21 | End: 2022-07-21 | Stop reason: HOSPADM

## 2022-07-21 RX ORDER — APREPITANT 40 MG/1
40 CAPSULE ORAL ONCE
Status: DISCONTINUED | OUTPATIENT
Start: 2022-07-21 | End: 2022-07-22 | Stop reason: HOSPADM

## 2022-07-21 RX ORDER — BACLOFEN 10 MG/1
10 TABLET ORAL 3 TIMES DAILY
Status: DISCONTINUED | OUTPATIENT
Start: 2022-07-21 | End: 2022-07-22 | Stop reason: HOSPADM

## 2022-07-21 RX ORDER — ONDANSETRON 2 MG/ML
4 INJECTION INTRAMUSCULAR; INTRAVENOUS EVERY 6 HOURS PRN
Status: DISCONTINUED | OUTPATIENT
Start: 2022-07-21 | End: 2022-07-21 | Stop reason: SDUPTHER

## 2022-07-21 RX ORDER — ROCURONIUM BROMIDE 10 MG/ML
INJECTION, SOLUTION INTRAVENOUS PRN
Status: DISCONTINUED | OUTPATIENT
Start: 2022-07-21 | End: 2022-07-21 | Stop reason: SDUPTHER

## 2022-07-21 RX ORDER — SODIUM CHLORIDE, SODIUM LACTATE, POTASSIUM CHLORIDE, CALCIUM CHLORIDE 600; 310; 30; 20 MG/100ML; MG/100ML; MG/100ML; MG/100ML
INJECTION, SOLUTION INTRAVENOUS CONTINUOUS
Status: DISCONTINUED | OUTPATIENT
Start: 2022-07-21 | End: 2022-07-21

## 2022-07-21 RX ORDER — APREPITANT 40 MG/1
CAPSULE ORAL
Status: COMPLETED
Start: 2022-07-21 | End: 2022-07-21

## 2022-07-21 RX ORDER — SODIUM CHLORIDE 0.9 % (FLUSH) 0.9 %
5-40 SYRINGE (ML) INJECTION PRN
Status: DISCONTINUED | OUTPATIENT
Start: 2022-07-21 | End: 2022-07-22 | Stop reason: HOSPADM

## 2022-07-21 RX ORDER — OXYCODONE HYDROCHLORIDE AND ACETAMINOPHEN 5; 325 MG/1; MG/1
1 TABLET ORAL
Status: DISCONTINUED | OUTPATIENT
Start: 2022-07-21 | End: 2022-07-21 | Stop reason: HOSPADM

## 2022-07-21 RX ORDER — NEOSTIGMINE METHYLSULFATE 5 MG/5 ML
SYRINGE (ML) INTRAVENOUS PRN
Status: DISCONTINUED | OUTPATIENT
Start: 2022-07-21 | End: 2022-07-21 | Stop reason: SDUPTHER

## 2022-07-21 RX ORDER — FAMOTIDINE 10 MG/ML
INJECTION, SOLUTION INTRAVENOUS
Status: COMPLETED
Start: 2022-07-21 | End: 2022-07-21

## 2022-07-21 RX ORDER — ACETAMINOPHEN 325 MG/1
650 TABLET ORAL EVERY 6 HOURS
Status: DISCONTINUED | OUTPATIENT
Start: 2022-07-21 | End: 2022-07-22 | Stop reason: HOSPADM

## 2022-07-21 RX ORDER — GABAPENTIN 100 MG/1
100 CAPSULE ORAL 3 TIMES DAILY
Status: DISCONTINUED | OUTPATIENT
Start: 2022-07-21 | End: 2022-07-22 | Stop reason: HOSPADM

## 2022-07-21 RX ORDER — ONDANSETRON 4 MG/1
4 TABLET, ORALLY DISINTEGRATING ORAL 3 TIMES DAILY PRN
Qty: 21 TABLET | Refills: 0 | Status: SHIPPED | OUTPATIENT
Start: 2022-07-21

## 2022-07-21 RX ORDER — SODIUM CHLORIDE 9 MG/ML
INJECTION, SOLUTION INTRAVENOUS CONTINUOUS
Status: DISCONTINUED | OUTPATIENT
Start: 2022-07-21 | End: 2022-07-22 | Stop reason: HOSPADM

## 2022-07-21 RX ADMIN — HYDROMORPHONE HYDROCHLORIDE 0.5 MG: 1 INJECTION, SOLUTION INTRAMUSCULAR; INTRAVENOUS; SUBCUTANEOUS at 10:51

## 2022-07-21 RX ADMIN — OXYCODONE HYDROCHLORIDE 5 MG: 5 TABLET ORAL at 20:28

## 2022-07-21 RX ADMIN — HYDROMORPHONE HYDROCHLORIDE 0.5 MG: 1 INJECTION, SOLUTION INTRAMUSCULAR; INTRAVENOUS; SUBCUTANEOUS at 10:42

## 2022-07-21 RX ADMIN — APREPITANT 40 MG: 40 CAPSULE ORAL at 08:24

## 2022-07-21 RX ADMIN — VANCOMYCIN HYDROCHLORIDE 1500 MG: 1.5 INJECTION, POWDER, LYOPHILIZED, FOR SOLUTION INTRAVENOUS at 09:20

## 2022-07-21 RX ADMIN — FAMOTIDINE 20 MG: 10 INJECTION, SOLUTION INTRAVENOUS at 08:28

## 2022-07-21 RX ADMIN — SODIUM CHLORIDE, POTASSIUM CHLORIDE, SODIUM LACTATE AND CALCIUM CHLORIDE: 600; 310; 30; 20 INJECTION, SOLUTION INTRAVENOUS at 11:14

## 2022-07-21 RX ADMIN — BACLOFEN 10 MG: 10 TABLET ORAL at 20:28

## 2022-07-21 RX ADMIN — SODIUM CHLORIDE: 9 INJECTION, SOLUTION INTRAVENOUS at 13:49

## 2022-07-21 RX ADMIN — SODIUM CHLORIDE, POTASSIUM CHLORIDE, SODIUM LACTATE AND CALCIUM CHLORIDE: 600; 310; 30; 20 INJECTION, SOLUTION INTRAVENOUS at 09:02

## 2022-07-21 RX ADMIN — Medication 0.5 MG: at 11:25

## 2022-07-21 RX ADMIN — FENTANYL CITRATE 100 MCG: 50 INJECTION, SOLUTION INTRAMUSCULAR; INTRAVENOUS at 09:53

## 2022-07-21 RX ADMIN — CEFAZOLIN 2000 MG: 10 INJECTION, POWDER, FOR SOLUTION INTRAVENOUS at 15:44

## 2022-07-21 RX ADMIN — SODIUM CHLORIDE, POTASSIUM CHLORIDE, SODIUM LACTATE AND CALCIUM CHLORIDE: 600; 310; 30; 20 INJECTION, SOLUTION INTRAVENOUS at 10:00

## 2022-07-21 RX ADMIN — PROMETHAZINE HYDROCHLORIDE 6.25 MG: 25 INJECTION INTRAMUSCULAR; INTRAVENOUS at 11:53

## 2022-07-21 RX ADMIN — MORPHINE SULFATE 2 MG: 2 INJECTION, SOLUTION INTRAMUSCULAR; INTRAVENOUS at 11:13

## 2022-07-21 RX ADMIN — PROPOFOL 180 MG: 10 INJECTION, EMULSION INTRAVENOUS at 09:07

## 2022-07-21 RX ADMIN — LIDOCAINE HYDROCHLORIDE 50 MG: 10 INJECTION, SOLUTION EPIDURAL; INFILTRATION; INTRACAUDAL; PERINEURAL at 09:07

## 2022-07-21 RX ADMIN — ONDANSETRON 4 MG: 2 INJECTION INTRAMUSCULAR; INTRAVENOUS at 10:15

## 2022-07-21 RX ADMIN — GABAPENTIN 100 MG: 100 CAPSULE ORAL at 20:28

## 2022-07-21 RX ADMIN — ONDANSETRON 4 MG: 2 INJECTION INTRAMUSCULAR; INTRAVENOUS at 11:34

## 2022-07-21 RX ADMIN — PROMETHAZINE HYDROCHLORIDE 6.25 MG: 25 INJECTION, SOLUTION INTRAMUSCULAR; INTRAVENOUS at 11:53

## 2022-07-21 RX ADMIN — Medication 100 MCG: at 09:29

## 2022-07-21 RX ADMIN — DEXAMETHASONE SODIUM PHOSPHATE 5 MG: 10 INJECTION INTRAMUSCULAR; INTRAVENOUS at 09:21

## 2022-07-21 RX ADMIN — SODIUM CHLORIDE: 9 INJECTION, SOLUTION INTRAVENOUS at 15:24

## 2022-07-21 RX ADMIN — HYDROMORPHONE HYDROCHLORIDE 0.5 MG: 1 INJECTION, SOLUTION INTRAMUSCULAR; INTRAVENOUS; SUBCUTANEOUS at 11:25

## 2022-07-21 RX ADMIN — SODIUM CHLORIDE, PRESERVATIVE FREE 10 ML: 5 INJECTION INTRAVENOUS at 20:29

## 2022-07-21 RX ADMIN — FENTANYL CITRATE 50 MCG: 50 INJECTION, SOLUTION INTRAMUSCULAR; INTRAVENOUS at 09:31

## 2022-07-21 RX ADMIN — CEFAZOLIN 2000 MG: 10 INJECTION, POWDER, FOR SOLUTION INTRAVENOUS at 08:17

## 2022-07-21 RX ADMIN — ROCURONIUM BROMIDE 40 MG: 10 INJECTION, SOLUTION INTRAVENOUS at 09:07

## 2022-07-21 RX ADMIN — ACETAMINOPHEN 650 MG: 325 TABLET ORAL at 20:28

## 2022-07-21 RX ADMIN — BISACODYL 5 MG: 5 TABLET, COATED ORAL at 15:42

## 2022-07-21 RX ADMIN — FENTANYL CITRATE 100 MCG: 50 INJECTION, SOLUTION INTRAMUSCULAR; INTRAVENOUS at 09:07

## 2022-07-21 RX ADMIN — MIDAZOLAM HYDROCHLORIDE 2 MG: 1 INJECTION, SOLUTION INTRAMUSCULAR; INTRAVENOUS at 09:04

## 2022-07-21 RX ADMIN — Medication 0.2 MG: at 09:28

## 2022-07-21 RX ADMIN — Medication 2 MG: at 10:17

## 2022-07-21 RX ADMIN — ENOXAPARIN SODIUM 30 MG: 100 INJECTION SUBCUTANEOUS at 08:19

## 2022-07-21 RX ADMIN — FENTANYL CITRATE 50 MCG: 50 INJECTION, SOLUTION INTRAMUSCULAR; INTRAVENOUS at 09:46

## 2022-07-21 RX ADMIN — Medication 0.4 MG: at 10:17

## 2022-07-21 RX ADMIN — CEFAZOLIN 2000 MG: 10 INJECTION, POWDER, FOR SOLUTION INTRAVENOUS at 23:32

## 2022-07-21 ASSESSMENT — PAIN SCALES - GENERAL
PAINLEVEL_OUTOF10: 4
PAINLEVEL_OUTOF10: 7
PAINLEVEL_OUTOF10: 3
PAINLEVEL_OUTOF10: 3
PAINLEVEL_OUTOF10: 6
PAINLEVEL_OUTOF10: 3
PAINLEVEL_OUTOF10: 5

## 2022-07-21 ASSESSMENT — PAIN DESCRIPTION - LOCATION
LOCATION: ABDOMEN

## 2022-07-21 ASSESSMENT — PAIN DESCRIPTION - ORIENTATION: ORIENTATION: RIGHT;MID

## 2022-07-21 ASSESSMENT — PAIN - FUNCTIONAL ASSESSMENT
PAIN_FUNCTIONAL_ASSESSMENT: NONE - DENIES PAIN
PAIN_FUNCTIONAL_ASSESSMENT: ACTIVITIES ARE NOT PREVENTED

## 2022-07-21 ASSESSMENT — PAIN DESCRIPTION - DESCRIPTORS: DESCRIPTORS: ACHING;DISCOMFORT

## 2022-07-21 ASSESSMENT — PAIN DESCRIPTION - PAIN TYPE
TYPE: SURGICAL PAIN
TYPE: SURGICAL PAIN

## 2022-07-21 ASSESSMENT — PAIN SCALES - WONG BAKER: WONGBAKER_NUMERICALRESPONSE: 0

## 2022-07-21 NOTE — ANESTHESIA POSTPROCEDURE EVALUATION
Department of Anesthesiology  Postprocedure Note    Patient: Laura Simms  MRN: [id number]  YOB: 1955  Date of evaluation: 7/21/2022      Procedure Summary     Date: 07/21/22 Room / Location: [facility] 02 / [address] [place] [address]    Anesthesia Start: 8824 Anesthesia Stop: 4297    Procedure: MONSPLASTY WITH INCISIONAL WOUND VAC (Abdomen) Diagnosis:       Cutaneous lipodystrophy      (PELVIC CUTANEOUS LIPODYSTROPHY)    Surgeons: Jami Bradley MD Responsible Provider: Chris Crain MD    Anesthesia Type: general ASA Status: 2          Anesthesia Type: No value filed.     Wilber Phase I: Wilber Score: 10    Wilber Phase II:        Anesthesia Post Evaluation    Patient location during evaluation: PACU  Patient participation: complete - patient participated  Level of consciousness: awake and alert  Airway patency: patent  Nausea & Vomiting: no nausea and no vomiting  Complications: no  Cardiovascular status: hemodynamically stable  Respiratory status: nasal cannula and spontaneous ventilation  Hydration status: euvolemic  Multimodal analgesia pain management approach

## 2022-07-21 NOTE — ANESTHESIA PRE PROCEDURE
Department of Anesthesiology  Preprocedure Note       Name:  Paulette Forrester   Age:  73 y.o.  :  1955                                          MRN:  [id number]         Date:  2022      Surgeon: Lonna Frankel):  Naila Tompkins MD    Procedure: Procedure(s): MONSPLASTY WITH INCISIONAL WOUND VAC  ABDOMEN LIPECTOMY SUCTION    Medications prior to admission:   Prior to Admission medications    Medication Sig Start Date End Date Taking?  Authorizing Provider   aspirin 81 MG EC tablet Take 81 mg by mouth daily    Historical Provider, MD   rosuvastatin (CRESTOR) 10 MG tablet Take 10 mg by mouth daily 3/8/21   Historical Provider, MD       Current medications:    Current Facility-Administered Medications   Medication Dose Route Frequency Provider Last Rate Last Admin    0.9 % sodium chloride infusion   IntraVENous Continuous Richard Maria MD        lactated ringers infusion   IntraVENous Continuous Richard Maria MD        sodium chloride flush 0.9 % injection 5-40 mL  5-40 mL IntraVENous 2 times per day Richard Maria MD        sodium chloride flush 0.9 % injection 5-40 mL  5-40 mL IntraVENous PRN Richard Maria MD        0.9 % sodium chloride infusion   IntraVENous PRN Richard Maria MD        sodium chloride flush 0.9 % injection 5-40 mL  5-40 mL IntraVENous 2 times per day Naila Tompkins MD        sodium chloride flush 0.9 % injection 5-40 mL  5-40 mL IntraVENous PRN Naila Tompkins MD        0.9 % sodium chloride infusion   IntraVENous PRN Naila Tompkins MD        ceFAZolin (ANCEF) 2000 mg in dextrose 5 % 50 mL IVPB  2,000 mg IntraVENous On Call to [facility] [address] [facility], MD        vancomycin (VANCOCIN) 1,500 mg in dextrose 5 % 250 mL IVPB  1,500 mg IntraVENous On Call to [address] [place] [address] Street, MD        enoxaparin Sodium (LOVENOX) injection 30 mg  30 mg SubCUTAneous Once Naila Tompkins MD        vancomycin (VANCOCIN) 1 g injection             gentamicin (GARAMYCIN) 40 MG/ML injection             EPINEPHrine 1 MG/ML injection             EPINEPHrine 1 MG/ML injection             gentian violet 1 % topical solution             lidocaine PF 1 % injection             enoxaparin Sodium (LOVENOX) 30 MG/0.3ML injection             ceFAZolin (ANCEF) IVPB             lidocaine PF 1 % injection                Allergies: Allergies   Allergen Reactions    Fd&C Blue #1 (Brilliant Blue Fcf) Anaphylaxis     Non-ionic dye    Iodides Anaphylaxis    Iodine Anaphylaxis       Problem List:  There is no problem list on file for this patient.       Past Medical History:        Diagnosis Date    Abdominal pannus     CAD (coronary artery disease)     Cancer ([address]     bcc skin/ multiple sites/ multiple excisions    Dyslipidemia     GERD (gastroesophageal reflux disease)     Glaucoma     Hyperlipidemia     Kidney stones     Lumbar radiculopathy     Obesity     PONV (postoperative nausea and vomiting)     Vitamin D deficiency        Past Surgical History:        Procedure Laterality Date    ABDOMINOPLASTY      ANKLE SURGERY Right     CATARACT REMOVAL WITH IMPLANT Bilateral ,    COLONOSCOPY      COSMETIC SURGERY      CYSTOSCOPY  0164,2614    EYE SURGERY Right 2017    retinal detached    LIPECTOMY      LITHOTRIPSY  0631,4451    x3 laser of stones/ stent placed    SKIN BIOPSY      neck bcc    SKIN BIOPSY  2010    x2 neck BCC    SKIN BIOPSY      SCC/ actinic keratosis, cheek, forehead    SKIN BIOPSY  2013    BCC rt temporal    SKIN BIOPSY  10/2017     rt breast /left shoulder skin cancers       Social History:    Social History     Tobacco Use    Smoking status: Former     Packs/day: 0.25     Years: 1.00     Pack years: 0.25     Types: Cigarettes     Start date: 1977     Quit date: 1977     Years since quittin.6    Smokeless tobacco: Never   Substance Use Topics    Alcohol use: Yes     Comment: social                                Counseling given: Not Answered      Vital Signs (Current):   Vitals: 07/11/22 1352 07/21/22 0735   BP:  130/71   Pulse:  63   Resp:  12   Temp:  97.3 °F (36.3 °C)   TempSrc:  Skin   SpO2:  99%   Weight: 220 lb (99.8 kg) 219 lb 12.8 oz (99.7 kg)   Height: 5' 4\" (1.626 m) 5' 4\" (1.626 m)                                              BP Readings from Last 3 Encounters:   07/21/22 130/71   03/31/21 (!) 147/83       NPO Status: Time of last liquid consumption: 2130                        Time of last solid consumption: 1800                        Date of last liquid consumption: 07/20/22                        Date of last solid food consumption: 07/20/22    BMI:   Wt Readings from Last 3 Encounters:   07/21/22 219 lb 12.8 oz (99.7 kg)   02/16/22 220 lb (99.8 kg)   03/31/21 227 lb 3.5 oz (103.1 kg)     Body mass index is 37.73 kg/m². CBC: No results found for: WBC, RBC, HGB, HCT, MCV, RDW, PLT    CMP: No results found for: NA, K, CL, CO2, BUN, CREATININE, GFRAA, AGRATIO, LABGLOM, GLUCOSE, GLU, PROT, CALCIUM, BILITOT, ALKPHOS, AST, ALT    POC Tests: No results for input(s): POCGLU, POCNA, POCK, POCCL, POCBUN, POCHEMO, POCHCT in the last 72 hours. Coags: No results found for: PROTIME, INR, APTT    HCG (If Applicable): No results found for: PREGTESTUR, PREGSERUM, HCG, HCGQUANT     ABGs: No results found for: PHART, PO2ART, UCF5SWP, CZH9TAD, BEART, [id number]     Type & Screen (If Applicable):  No results found for: LABABO, LABRH    Drug/Infectious Status (If Applicable):  No results found for: HIV, HEPCAB    COVID-19 Screening (If Applicable):   Lab Results   Component Value Date/Time    COVID19 Not Detected 09/20/2021 06:19 PM           Anesthesia Evaluation  Patient summary reviewed and Nursing notes reviewed   history of anesthetic complications: PONV.   Airway: Mallampati: III  TM distance: >3 FB   Neck ROM: full  Mouth opening: > = 3 FB   Dental: normal exam         Pulmonary:Negative Pulmonary ROS and normal exam                              ROS comment: [phone] [facility]

## 2022-07-21 NOTE — OP NOTE
Operative Note      Patient: Shikha Holbrook  YOB: 1955  MRN: [id number]    Date of Procedure: 7/21/2022    Pre-Op Diagnosis: PELVIC CUTANEOUS LIPODYSTROPHY    Post-Op Diagnosis:        Procedure(s): MONSPLASTY WITH INCISIONAL WOUND VAC    Surgeon(s):  Zeinab Nuenz MD    Assistant:   * No surgical staff found *    Anesthesia: General    Estimated Blood Loss (mL): Minimal    Complications: None    Specimens:   ID Type Source Tests Collected by Time Destination   A : abdominal scar and mons Tissue Tissue SURGICAL PATHOLOGY Zeinab Nunez MD 7/21/2022 7513        Implants:  * No implants in log *      Drains:   Closed/Suction Drain Right;Lateral;Distal RLQ Bulb (Active)   Site Description Unable to view 07/21/22 1054   Dressing Status Clean, dry & intact 07/21/22 1054   Drainage Appearance Bloody 07/21/22 1054   Drain Status Compressed 07/21/22 1054           INDICATION FOR PROCEDURE:  The patient is 73 y.o. female . All the risks, benefits, and alternative treatments were explained to the patient and an informed consent was obtained. DESCRIPTION OF PROCEDURE:  The patient was marked in the holding area. The patient was brought into the room, was placed on the table in the supine position. SCDs were placed and turned on prior to induction of anesthesia via endotracheal intubation. Then all areas were prepped and draped in usual customary sterile manner. A timeout was performed. Everybody in the room was in agreement with the timeout. Than the previously marked area was stable to ensure that we are able to resect the area. The markings were slightly changed. Then the staples were removed. Then using a #10 blade an incision was made over the previously marked area over the lower abdominal scar and over the mons pubis. Dissection was made through the skin and subcutaneous tissue all the way down to the fascia. The dissection was performed using a cautery.   Then marked area of the mons pubis and the lower abdominal scar was excised in its entirety. Then hemostasis was achieved. Then the area was irrigated. Any other bleeding points were noticeable and was cauterized. #19 Raheem Chatters drain was placed through a separate stab wound. It was sutured in place using 2-0 Prolene. Then extensive undermining was performed superiorly medially and laterally to obtain a tension-free closure. Then using 2-0 Vicryl in an interrupted manner to deep tissue was closed. Then using 2-0 Quill in a subcuticular manner the skin was closed. Local anesthetic was injected for postop comfort. Then an incisional wound VAC was placed. A good seal was obtained. Patient tolerated procedure well. Patient was transferred to recovery room in stable condition.       Electronically signed by Toshia Mejia MD on 7/21/2022 at 11:30 AM

## 2022-07-21 NOTE — DISCHARGE INSTRUCTIONS
POST-OPERATIVE INSTRUCTIONS FOR SUTURE LINECARE   Incisions and suture lines are a necessary part of surgery. These lines take many months to fully heal. Part of the healing process requires proper cleansing and care. In addition, there are treatments that can help resulting scars to be flatter, finer, and less noticeable. There is no guarantee to what a scar will look like once it has fully healed, however the following instructions are important to a good outcome. Do not smoke. You have been advised to quit before surgery. Do not start after surgery. Smoking decreases the oxygen in your blood and greatly impacts your ability to heal.  Do not smoke until your incisions have fully healed. .      IF YOU HAVE AN INCISIONAL WOUND VAC, DO NOT REMOVE THE DRESSING UNTIL the sponge is no longer holding suction. Then use adhesive remover and removed the adhesive and the sponge. After the wound VAC is removed please place ABD and tape over the wound. You may remove the drain when it is draining 30 cc or less for 3 days in a row. Please get the suture with the suture removal kit that is provided. If you have sutures, they will remain in the skin anywhere from 7-21 days, or longer depending on the area and your healing. Please call for an appointment if one has not been scheduled. You may notice a slight drainage of either blood or a clear type of liquid from the area, which is normal.  If the drainage has an odor to it call the office. DIET: Resume your previous diet. ACTIVITY: Avoid straining, strenuous exercise, or lifting over five pounds for 14 days, unless instructed otherwise. Depending on they type of surgery you have had this may be as long as Jimmy [address]. (Do not bend over for the first 24 hours after your surgery). Direct trauma and physical stress may result in a separation of the suture line or wider scars. MEDICATIONS: If you have been given an antibiotic, take until finished. Please take pain medication as prescribed, follow directions CAREFULLY. Note: As Needed  Do not drive or operate machinery while on pain medications. WORK/SCHOOL: This will be decided at the time of surgery or your follow up visit. Do not swim in any lake, ocean, swimming pool or other water for at least 5 days or other wise instructed. INFORMATION: Pain and swelling in the surgical area is normal, and will reduce over the several weeks after surgery, at times it may last several months after surgery. Avoid sun burns or over exposing your skin to the sun for two weeks prior to surgery, and one year after your surgery. You must wear water proof sun block with at least an SPF of 30 at all times in the surgically treated area including the suture line. This is imperative at least 30 minutes before any prolonged sun exposure is expected. Protective clothing and wide brimmed hat for facial surgeries is highly recommended. New scars are sensitive to sunlight and if unprotected will result in permanent changes in scar color to darker, lighter or discolored. At times you may have a modular, bumpy, raised or thickened appearance to the scar. This may be hypertrophic or keloid. Please contact our office as soon as possible. If you have a Biopatch in place, leave it intact until office visit. f you have drains clean the drain site with Hydrogen peroxide and place antibiotic ointment around it and cover with drain sponge. This is to be done daily or as needed. If you have Dermabond (or glue) on your incision, DO NOT use any ointments or creams as these will break the glue down. The glue is there to help the sutures stay secure. You may keep the area cover with a plain bandage such as ABD, or 4x4. Call the office if any of the following occur:   Excessive bleeding from the incision   Excessive swelling   Excessive redness  Extreme throbbing  Fever over 100.4°F.   Foul smelling drainage      Our office numbers are as follows:  [place] office:     [phone] for a Closed Suction Drainage Tube  A drainage tube removes fluid from around an incision. This helps prevent infection and promotes healing. The collection bulb at the end of the tube is squeezed and plugged to create suction. The bulb should be emptied and reset when half full to maintain adequate suction. You need to empty the bulb and clean the skin around the drain as often as your healthcare provider tells you to. Follow the steps below. Have the following items ready:  Disposable gloves  Measuring cup  Record sheet  Gauze or paper towel  Sterile cotton swabs or 4\" x 4\" gauze pads  Sterile saline or soap and water       Step 1. Empty the bulb  Wash your hands and put on a new pair of disposable gloves. Point the top of the bulb away from you and remove the stopper. Turn the bulb upside down over a measuring cup. Squeeze the fluid into the cup. Make sure the bulb is totally empty. Put the cup to one side. You can record the volume of liquid in the cup after you clean and reconnect the bulb in step 2. Step 2. Clean and reconnect the bulb  Clean the top of the bulb with clean gauze or or alcohol as needed. Squeeze the bulb tight, and put the stopper back on the top. Record the amount of fluid in the cup. Then, empty the cup as directed. Step 3. Clean the site  If you have a Biopatch in place leave that intact    When to call your healthcare provider  Call your healthcare provider if you notice any of these changes:   The amount of fluid increases or decreases suddenly  Large amount of blood or a clot in drainage  Color, odor, or thickness of the fluid changes  Tube falls out or the incision opens  Skin around the drain is red, swollen, painful, or seeping pus  You have a fever of 100.4°F (38°C) or higher, or as directed by your healthcare provider     If the tube isn't draining  Here are tips to drain the tube:  Uncurl any kinks in the tube. With one hand, firmly hold the base of the tube between your thumb and index finger. Do not touch the incision. Put the thumb and index finger of your other hand on the tube, next to the first hand. Pinch your fingers together. Then pull them along the tube toward the bag. This will help push any clogged fluid through the tube. This is called \"stripping the tube. \" You may find it helpful to hold an alcohol swab between your fingers and the tube to lubricate the tubing. If the tube still does not drain, call your healthcare provider. [address] [place] [address]      Drain care is required 3 times per day immediately following surgery. Follow these instructions explicitly and record drains as required below. You must complete this 3 times per day, and at any time the drainage bag is more than 1/3 full. You will be required to submit the drain log to Clarita Srinivasan MD for your patient record. 1.Wash your hands well with anti-bacterial soap. 2.  Strip the drain holding it at the base of the is not pulled out. You must strip. Drain at least 3 times a day if not more to prevent clots forming in the tubing. 3. Open cap on the drain bulb. Pour out drainage into a clean measuring cup. Record the amount of drainage and time of day as indicated. Dispose of drainage in the toilet and flush. 4. Squeeze bulbs tight. Replace cap. Once you have measured your fluid drainage, you must milk or strip the drain tubing. This is done to prevent small clots from blocking fluid flow. To do this, hold the tubing securely at the skin site with one hand. With the other hand, pinch the tubing between your thumb and index finger and apply firm pressure as you strip the tubing towards the bulb. If the tubing and bulb come apart, wipe the ends with alcohol and reconnect. Squeeze the bulb again and replace cap.       Cleansing the Drain Site  If you have Biopatch in place you do not need to do the following steps. Once per day or as instructed, change gauze around drain sites. Change the gauze more often if it becomes saturated. Check the skin around the drains for leakage or redness (a slight redness immediately around the tube is not unusual). If there is leakage, begin milking the tubes downward towards the bulb and continue milking every hour until leakage slows down. Apply Vaseline or Thermazine (Silvadine) around the drain insertion site. Attach the drainage bag to your clothes so that there is no tension on the insertion site. Additional Instruction  Always Secure the drain to your clothing so that there is no tension on the drain at the incision site  Do not cut the drains  Keep tubes connected to the bulbs  Check that the bulb is always deflated (or flat)      Caution and Concern  Notify Nick Rowell MD immediately if any of the following occur:  A large amount of leakage around the drain  A marked increase in drainage output (double your usual flow)  Increased heat, redness or tenderness around the insertion site         Name:  Martha Marie   YOB: 1955  Surgery Date:  7/21/22  Date Time                 Drain #1 (right)   amount                Drain #2  (left)  amount            Drain #3 (back)  amount                                                                                                                                                                                                                                                                                                                                                                         Please note you must strip your drains at least once a day. Please call the office if change in color of drainage or a sudden drop in the amount of drainage.

## 2022-07-21 NOTE — INTERVAL H&P NOTE
Update History & Physical    The patient's History and Physical of July 21, 2022 was reviewed with the patient and I examined the patient. There was no change. The surgical site was confirmed by the patient and me. Plan: The risks, benefits, expected outcome, and alternative to the recommended procedure have been discussed with the patient. Patient understands and wants to proceed with the procedure.      Electronically signed by Fay Mariscal MD on 7/21/2022 at 8:59 AM

## 2022-07-21 NOTE — H&P
[address] [facility]  [facility] [id number]       History and Physical     No chief complaint on file. HPI:   Cecy Valerio is a 73 y.o. female who presents discussed mons plasty. Patient status post abdominoplasty. Patient has excess skin over her mons pubis. The excess skin may be lipodystrophy however lymphedema of the mons cannot be completely excluded. Patient is here to discuss her options. Patient has discomfort due to rubbing of the underpants in the area.     Medications:     Current Facility-Administered Medications   Medication Dose Route Frequency Provider Last Rate Last Admin    0.9 % sodium chloride infusion   IntraVENous Continuous Arslan Madison MD        lactated ringers infusion   IntraVENous Continuous Arslan Madison MD        sodium chloride flush 0.9 % injection 5-40 mL  5-40 mL IntraVENous 2 times per day Arslan Madison MD        sodium chloride flush 0.9 % injection 5-40 mL  5-40 mL IntraVENous PRN Arslan Madison MD        0.9 % sodium chloride infusion   IntraVENous PRN Arslan Madison MD        sodium chloride flush 0.9 % injection 5-40 mL  5-40 mL IntraVENous 2 times per day April Mcconnell MD        sodium chloride flush 0.9 % injection 5-40 mL  5-40 mL IntraVENous PRN April Mcconnell MD        0.9 % sodium chloride infusion   IntraVENous PRN April Mcconnell MD        vancomycin (VANCOCIN) 1,500 mg in dextrose 5 % 250 mL IVPB  1,500 mg IntraVENous On Call to [address] [place] [address], MD        enoxaparin Sodium (LOVENOX) injection 30 mg  30 mg SubCUTAneous Once April Mcconnell MD        vancomycin (VANCOCIN) 1 g injection             gentamicin (GARAMYCIN) 40 MG/ML injection             EPINEPHrine 1 MG/ML injection             EPINEPHrine 1 MG/ML injection             gentian violet 1 % topical solution             lidocaine PF 1 % injection             lidocaine PF 1 % injection             lidocaine PF 1 % injection             lidocaine PF 1 % injection bupivacaine-EPINEPHrine (MARCAINE-w/EPINEPHRINE) 0.25% -[id number] injection               Allergies: Allergies   Allergen Reactions    Fd&C Blue #1 (Brilliant Blue Fcf) Anaphylaxis     Non-ionic dye    Iodides Anaphylaxis    Iodine Anaphylaxis     Review of Systems:   Constitutional: Negative for fever, chills, fatigue and unexpected weight change. HENT: Negative for hearing loss, sore throat and facial swelling. Eyes: Negative for pain and discharge. Respiratory: Negative for cough and shortness of breath. Cardiovascular: Negative for chest pain. Gastrointestinal: Negative for nausea, vomiting, diarrhea and constipation. Skin: History of irritation. .   Neurological: Negative for seizures, syncope and numbness. Hematological: Does not bruise/bleed easily. Psychiatric/Behavioral: Negative for behavioral problems. The patient is not nervous/anxious. Past Medical History:   Diagnosis Date    Abdominal pannus     CAD (coronary artery disease)     Cancer (HCC)     bcc skin/ multiple sites/ multiple excisions    Dyslipidemia     GERD (gastroesophageal reflux disease)     Glaucoma     Hyperlipidemia     Kidney stones     Lumbar radiculopathy     Obesity     PONV (postoperative nausea and vomiting)     Vitamin D deficiency      Past Surgical History:   Procedure Laterality Date    ABDOMINOPLASTY      ANKLE SURGERY Right 1980s    CATARACT REMOVAL WITH IMPLANT Bilateral 2015,2017    COLONOSCOPY      COSMETIC SURGERY      CYSTOSCOPY  2011,2012    EYE SURGERY Right 2017    retinal detached    LIPECTOMY      LITHOTRIPSY  2011,2012    x3 laser of stones/ stent placed    SKIN BIOPSY  2012    neck bcc    SKIN BIOPSY  2010    x2 neck BCC    SKIN BIOPSY  2014    SCC/ actinic keratosis, cheek, forehead    SKIN BIOPSY  2013    [employer] rt temporal    SKIN BIOPSY  10/2017     rt breast /left shoulder skin cancers     Social History     Socioeconomic History    Marital status:       Spouse name: Not on file    Number of children: Not on file    Years of education: Not on file    Highest education level: Not on file   Occupational History    Not on file   Tobacco Use    Smoking status: Former     Packs/day: 0.25     Years: 1.00     Pack years: 0.25     Types: Cigarettes     Start date: 1977     Quit date: 1977     Years since quittin.6    Smokeless tobacco: Never   Vaping Use    Vaping Use: Never used   Substance and Sexual Activity    Alcohol use: Yes     Comment: social    Drug use: Not Currently    Sexual activity: Not on file   Other Topics Concern    Not on file   Social History Narrative    Not on file     Social Determinants of Health     Financial Resource Strain: Not on file   Food Insecurity: Not on file   Transportation Needs: Not on file   Physical Activity: Not on file   Stress: Not on file   Social Connections: Not on file   Intimate Partner Violence: Not on file   Housing Stability: Not on file     History reviewed. No pertinent family history. Physical Exam:   /71   Pulse 63   Temp 97.3 °F (36.3 °C) (Skin)   Resp 12   Ht 5' 4\" (1.626 m)   Wt 219 lb 12.8 oz (99.7 kg)   SpO2 99%   BMI 37.73 kg/m²    Body mass index is 37.73 kg/m². Physical Exam   Nursing note and vitals reviewed. Constitutional: Oriented to person, place, and time. Appears well-developed and well-nourished. No distress. Head: Normocephalic and atraumatic. Eyes: Conjunctivae and EOM are normal.   Pulmonary/Chest: Effort normal. No respiratory distress. Neurological: Alert and oriented to person, place, and time. Skin: Excess skin and soft tissue over the mons pubis. Psychiatric: Normal mood and affect. Behavior is normal    Imaging:                       Impression/Plan:      Diagnosis Orders   1. Cutaneous lipodystrophy       There is no problem list on file for this patient. Plan:  Patient with lipodystrophy versus edema mons pubis. The risk of mons plasty was discussed with patient detail. All questions were answered. We talked about lymphedema of the months. We did about the numbness and changes in sensation the mons labia and clitoris. We discussed urine stream may not be straight. The malposition of urethra. Patient demonstrated understanding. All questions were answered. ASPS consent mons pubis lift    GENERAL INFORMATION  Mons pubis lift is a surgical procedure to remove excess skin and fatty tissue from the mons pubis. Obese individuals who intend to lose weight should postpone all forms of body contouring surgery until they have reached a stable weight. ALTERNATIVE TREATMENTS  Alternative forms of management consist of not treating the areas of loose skin and fatty deposits. Liposuction may be a surgical alternative to if there is good skin tone and localized mons pubis fatty tissue. If you have lost your skin laxity as apparent by multiple stretch marks you will not be a good liposuction candidate. Diet and exercise programs may be of benefit in the overall reduction of excess body fat and contour improvement. Risks and potential complications are associated with alternative surgical forms of treatment. RISKS OF mons pubis lift SURGERY  Every surgical procedure involves a certain amount of risk and it is important that you understand these risks and the possible complications associated with them. In addition, every procedure has limitations. An individual's choice to undergo a surgical procedure is based on the comparison of the risk to potential benefit. Although the majority of patients do not experience these complications, you should discuss each of them with your plastic surgeon to make sure you completely understand all possible consequences of an abdominoplasty/panniculectomy. Bleeding- It is possible, though unusual, to experience a bleeding episode during or after surgery.      Should post-operative bleeding occur, it may require an emergency treatment to drain the accumulated blood or blood transfusion. Intra-operative blood transfusions may be required. Do not take any aspirin or anti-inflammatory medications for ten days before surgery, as this may increase the risk of bleeding. Non-prescription herbs and dietary supplements can increase the risk of surgical bleeding. Hematoma can occur at any time following injury. If blood transfusions are needed to treat blood loss, there is a risk of blood related infections such as hepatitis and the HIV (AIDS). Heparin medications that are used to prevent blood clots in veins can produce bleeding and decreased blood platelets. Infection - Infection is can occur after this surgery. Should an infection occur, treatment including antibiotics, hospitalization, or additional surgery may be necessary. There is a greater risk of infection when body contouring procedures are performed in conjunction with abdominal surgical procedures. Change in Skin Sensation- It is common to experience diminished (or loss) of skin sensation in areas that have had surgery. Diminished (or complete loss of skin sensation) may not totally resolve after an abdominoplasty/pannicular     Skin Contour Irregularities- Contour and shape irregularities and depressions may occur after mons pubis lift. Visible and palpable wrinkling of skin can occur. Residual skin irregularities at the ends of the incisions or dog ears are always a possibility as is skin pleating when there is excessive redundant skin. This may improve with time, or it can be surgically corrected. Major Wound Separation- Wounds may separate after surgery. Should this occur, additional treatment including surgery may be necessary. Skin Discoloration / Swelling- Bruising and swelling normally occurs following mons lift. The skin in or near the surgical site can appear either lighter or darker than surrounding skin.   Although uncommon, swelling and skin discoloration may persist for long periods of time and, in rare situations, may be permanent. Skin Sensitivity- Itching, tenderness, or exaggerated responses to hot or cold temperatures may occur after surgery. Usually this resolve during healing, but in some situations it may be chronic. Sutures- Most surgical techniques use deep sutures. You may notice these sutures after your surgery. Sutures may spontaneously poke through the skin, become visible or produce irritation that requires removal.    Damage to Deeper Structures- There is the potential for injury to deeper structures including nerves, blood vessels, muscles, and lungs (pneumothorax), or intra-abdominal injury can occure during any surgical procedure. The potential for this to occur varies according to the type of procedure being performed. Injury to deeper structures may be temporary or permanent. Fat Necrosis- Fatty tissue found deep in the skin might die. This may produce areas of firmness within the skin. Additional surgery to remove areas of fat necrosis may be necessary. There is the possibility of contour irregularities in the skin that may result from fat necrosis. Obesity: If you're BMI is greater than 30 you may have a higher chance of complications. This may include but not limited to wound healing and infections. Also, if you have other medical problems such as diabetes and hypertension it may affect your healing as well as your surgical results in bleeding. Pubic Distortion- There will be distortion of their labia and pubic area. Additional treatment including surgery may be necessary. Even with additional surgery she may never have symmetry. At times you may have painful intercourse. Scarring- All surgery leaves scars, some more visible than others. This surgery will leave large scars. Abnormal scars may occur within the skin and deeper tissues depending on your healing.   Scars may be unattractive and of different color than surrounding skin. Scar appearance may also vary within the same scar, exhibit contour variations or \"bunching\" due to the amount of excess skin. Scars may be asymmetrical (appear different between right and left side of the body). There is the possibility of visible marks in the skin from sutures. In some cases, scars may require surgical revision or treatment. Surgical Anesthesia- Both local and general anesthesia involve risk. There is the possibility of complications, injury, and even death from all forms of surgical anesthesia or sedation    Asymmetry- Symmetrical body appearance may not result from mons pubis lift factors such as skin tone, fatty deposits, skeletal prominence, and muscle tone may contribute to normal asymmetry in body features. Additional surgery may be necessary to attempt to attempt to improve asymmetry. Allergic Reactions- In some cases, local allergies to tape, suture material and glues, blood products, topical preparations or injected agents have been reported. Serious systemic reactions including shock (anaphylaxis) may occur to drugs used during surgery and prescription medications. Allergic reactions may require additional treatment. Delayed Healing- Wound disruption or delayed wound healing is possible. Some areas of the mons pubis may not heal normally and may take a long time to heal.  Some areas of skin or tissue may die. This may require frequent dressing changes or further surgery to remove the non-healed tissue. Smokers have a greater risk of skin loss and wound healing complications. Also, patient with certain systemic disease or taking certain medications are at increased risk. Also, infections under the pannus may affect your healing. Seroma- Fluid accumulations infrequently occur in between the skin and the mons pubis. This may require additional procedures for drainage of fluid.       Shock- In rare circumstances, your surgical procedure can cause severe trauma, particularly when multiple or extensive procedures are performed. Although serious complications are infrequent, infections or excessive fluid loss can lead to severe illness and even death. If surgical shock occurs, hospitalization and additional treatment would be necessary. Surgical Wetting Solutions-There is the possibility that large volumes of fluid containing dilute local anesthetic drugs and epinephrine that is injected into fatty deposits during surgery may contribute to fluid overload or systemic reaction to these medications. Additional treatment including hospitalization may be necessary. Persistent Swelling (Lymphedema)- Persistent swelling in the legs can occur following mons pubis lift    Pain- You will experience pain after your surgery. Pain of varying intensity and duration may occur and persist after panniculectomy. Chronic pain may occur every from nerves becoming trapped in scar tissue after panniculectomy. There may be numbness that can occur after a panniculectomy this could be temporary or permanent    Unsatisfactory Result- There is no guarantee or warranty expressed or implied, on the results that may be obtained. You may be disappointed with the results of panniculectomy surgery. This would include risks such as asymmetry, unsatisfactory or highly visible surgical scar location, unacceptable visible deformities, bunching and rippling in the skin near the suture lines or at the ends of the incisions (dog ears), poor healing, wound disruption, and loss of sensation. It may not be possible to correct or improve the effects of surgical scars. In some situations, it may not be possible to achieve optimal results with a single surgical procedure. Additional surgery may be required to improve results.     Deep Venous Thrombosis, Cardiac and Pulmonary Complications- Surgery, especially longer procedures, may be associated with the formation of, or increase in, blood clots in the venous system. Pulmonary complications may occur secondarily to both blood clots (pulmonary emboli), fat deposits (fat emboli) or partial collapse of the lungs after general anesthesia. Pulmonary and fat emboli can be life-threatening or fatal in some circumstances. Air travel, inactivity and other conditions may increase the incidence of blood clots traveling to the lungs causing a major blood clot that may result in death. It is important to discuss with your physician any past history of blood clots, swollen legs or the use of estrogen or birth control pills that may contribute to this condition. Cardiac complications are a risk with any surgery and anesthesia, even in patients without symptoms. Should any of these complications occur, you may require hospitalization and additional treatment. If you experience shortness of breath, chest pains, or unusual heart beats, seek medical attention immediately. ADDITIONAL ADVISORIES    Long-Term Results- Subsequent alterations in the appearance of your body may occur as the result of aging, sun exposure, weight loss, weight gain, pregnancy, menopause or other circumstances not related to your surgery. Metabolic Status of Massive Weight Loss Patients- Your personal metabolic status of blood chemistry and protein levels may be abnormal following massive weight loss and surgical procedures to make a patient lose weight. Individuals with abnormalities may be a risk for serious medical and surgical complications, including delayed wound healing, infection or even in rare cases, death. Body-Piercing Procedures- Individuals who currently wear body-piercing jewelry or are seeking to undergo body-piercing procedures must consider the possibility that an infection could develop anytime following this procedure. Treatment including antibiotics, hospitalization or additional surgery may be necessary.      Intimate Relations After Surgery- Surgery involves coagulating of blood vessels and increased activity of any kind may open these vessels leading to a bleed, or hematoma. Increased activity that increased your pulse or heart rate may cause additional bruising, swelling, and the need for return to surgery and control bleeding. It is wise to refrain from sexual activity until your physician states it is safe. Medications- There are many adverse reactions that occur as the result of taking over the counter, herbal, and/or prescription medications. Be sure to check with your physician about any drug interactions that may exist with medications that you are already taking. If you have an adverse reaction, stop the drugs immediately and call your plastic surgeon for further instructions. If the reaction is severe, go immediately to the nearest emergency room. When taking the prescribed pain medications after surgery, realize that they can affect your thought process and coordination. Do not drive, do not operate complex equipment, do not make any important decisions and do not drink any alcohol while taking these medications. Be sure to take your prescribed medication only as directed. If at blood thinners such as aspirin and Coumadin or Plavix etc. Is prescribed by a physician you must consult with the prescribing physician prior to stopping any of the blood thinners. Our focus is improvement rather than perfection. Complications or less than satisfactory results are sometimes unavoidable, may require additional surgery and often are stressful. Smoking, Second-Hand Smoke Exposure, Nicotine Products (Patch, Gum, Nasal Spray)-   Patients who are currently smoking, use tobacco products, or nicotine products (patch, gum, or nasal spray) are at a greater risk for significant surgical complications of skin dying, delayed healing, and additional scarring.   Individuals exposed to second-hand smoke are also at potential risk for similar complications attributable to nicotine exposure. Additionally, smokers may have a significant negative effect on anesthesia and recovery from anesthesia, with coughing and possibly increased bleeding. Individuals who are not exposed to tobacco smoke or nicotine-containing products have a significantly lower risk of this type of complication. It is important to refrain from smoking at least 6-8 weeks before surgery and I do not smoke for 8 weeks to 3 months after surgery. Post-bariatric patients: It is imperative that quit smoking at least 6-8 weeks before undergoing this procedure as it will adversely affect your outcome. ADDITIONAL SURGERY NECESSARY (RE-OPERATIONS)  There are many variable conditions that may influence the long-term result of surgery. Should complications occur, additional surgery or other treatments may be necessary. Secondary surgery may be necessary to obtain optimal results. Risks and complications can occur with any surgery, the risks cited are particularly associated with abdominoplasty/panniculectomy. Other complications and risks can occur but are even more uncommon. The practice of medicine and surgery is not an exact science. There is no guarantee or warranty expressed or implied, on the results that may be obtained. In some situations, it may not be possible to achieve optimal results with a single surgical procedure or even a multiple procedure. PATIENT COMPLIANCE   Follow all physician instructions carefully; this is essential for the success of your outcome. It is important that the surgical incisions are not subjected to excessive force, swelling, abrasion, or motion during the time of healing. Personal and vocational activity needs to be restricted. Protective dressings and drains should not be removed unless instructed by me. Successful post-operative function depends on both surgery and subsequent care.   Physical activity that increases your pulse or heart rate may cause bruising, swelling, fluid accumulation and the need for return to surgery. It is wise to refrain from intimate physical activities after surgery until your physician states it is safe. It is important that you participate in follow-up care, return for aftercare, and promote your recovery after surgery. FINANCIAL RESPONSIBILITIES  The cost of surgery involves several charges for the services provided. The total includes fees charged by your surgeon, the cost of surgical supplies, anesthesia, laboratory tests, and possible hospital charges, depending on where the surgery is performed. Depending on whether the cost of surgery is covered by an insurance plan, you will be responsible for necessary co-payments, deductibles, and charges not covered. The fees charged for this procedure do not include any potential future costs for additional procedures that you elect to have or require in order to revise, optimize, or complete your outcome. Additional costs may occur should complications develop from the surgery. Secondary surgery or hospital day-surgery charges involved with revision surgery will also be your responsibility. HEALTH INSURANCE  Most health insurance companies exclude coverage for cosmetic surgical operations any complications that might occur from surgery. Please carefully review your health insurance subscriber-information pamphlet or contact your insurance company for a detailed explanation of their policies for covering abdominoplasty/panniculectomy procedures. Most insurance plans may exclude coverage for secondary or revisionary surgery.     Electronically signed by:  Vikki Waller MD 7/21/2022

## 2022-07-22 VITALS
BODY MASS INDEX: 37.64 KG/M2 | TEMPERATURE: 98.2 F | WEIGHT: 220.46 LBS | SYSTOLIC BLOOD PRESSURE: 150 MMHG | DIASTOLIC BLOOD PRESSURE: 60 MMHG | HEART RATE: 69 BPM | RESPIRATION RATE: 16 BRPM | HEIGHT: 64 IN | OXYGEN SATURATION: 95 %

## 2022-07-22 LAB — SURGICAL PATHOLOGY REPORT: NORMAL

## 2022-07-22 PROCEDURE — 6360000002 HC RX W HCPCS: Performed by: PLASTIC SURGERY

## 2022-07-22 PROCEDURE — 6370000000 HC RX 637 (ALT 250 FOR IP): Performed by: PLASTIC SURGERY

## 2022-07-22 PROCEDURE — 99024 POSTOP FOLLOW-UP VISIT: CPT | Performed by: PLASTIC SURGERY

## 2022-07-22 RX ADMIN — BISACODYL 5 MG: 5 TABLET, COATED ORAL at 08:25

## 2022-07-22 RX ADMIN — ROSUVASTATIN 10 MG: 20 TABLET, FILM COATED ORAL at 08:25

## 2022-07-22 RX ADMIN — ASPIRIN 81 MG: 81 TABLET, COATED ORAL at 08:25

## 2022-07-22 RX ADMIN — CEFAZOLIN 2000 MG: 10 INJECTION, POWDER, FOR SOLUTION INTRAVENOUS at 08:26

## 2022-07-22 RX ADMIN — ACETAMINOPHEN 650 MG: 325 TABLET ORAL at 03:16

## 2022-07-22 RX ADMIN — GABAPENTIN 100 MG: 100 CAPSULE ORAL at 08:25

## 2022-07-22 RX ADMIN — BACLOFEN 10 MG: 10 TABLET ORAL at 08:24

## 2022-07-22 ASSESSMENT — PAIN SCALES - GENERAL
PAINLEVEL_OUTOF10: 0
PAINLEVEL_OUTOF10: 0

## 2022-07-22 NOTE — DISCHARGE INSTR - DIET

## 2022-07-22 NOTE — PLAN OF CARE
Problem: Pain  Goal: Verbalizes/displays adequate comfort level or baseline comfort level  Outcome: Progressing     Problem: Discharge Planning  Goal: Discharge to home or other facility with appropriate resources  Outcome: Progressing  Flowsheets  Taken 7/21/2022 2000 by Dustin Gracia RN  Discharge to home or other facility with appropriate resources: Identify barriers to discharge with patient and caregiver  Taken 7/21/2022 1600 by Torri Lopez RN  Discharge to home or other facility with appropriate resources:   Identify barriers to discharge with patient and caregiver   Refer to discharge planning if patient needs post-hospital services based on physician order or complex needs related to functional status, cognitive ability or social support system   Identify discharge learning needs (meds, wound care, etc)

## 2022-07-22 NOTE — DISCHARGE SUMMARY
Discharge Summary    Rylee Stovall Date/Time of Discharge: No discharge date for patient encounter. CSN: [id number] Attending Provider: Sarah Mora MD   Room/Bed: [facility]/181-67 : 1955 Age: 73 y.o. 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 Patient had postop nausea vomiting and pain. Patient was admitted for pain control as well as control of nausea vomiting. Consults:   none    Significant Diagnostic Studies:   None     Treatments:    surgery: Mons plasty    Discharge Exam:   Not done    Disposition:   home  Current Discharge Medication List        START taking these medications    Details   oxyCODONE-acetaminophen (PERCOCET) 5-325 MG per tablet Take 1 tablet by mouth every 4 hours as needed for Pain for up to 7 days. Take lowest dose possible to manage pain  Qty: 42 tablet, Refills: 0    Comments: Reduce doses taken as pain becomes manageable  Associated Diagnoses: Cutaneous lipodystrophy      cephALEXin (KEFLEX) 500 MG capsule Take 1 capsule by mouth in the morning and 1 capsule at noon and 1 capsule in the evening and 1 capsule before bedtime. Do all this for 10 days. Qty: 40 capsule, Refills: 0      baclofen (LIORESAL) 10 MG tablet Take 1 tablet by mouth in the morning and 1 tablet at noon and 1 tablet before bedtime. Do all this for 10 days. Qty: 30 tablet, Refills: 0      ondansetron (ZOFRAN-ODT) 4 MG disintegrating tablet Take 1 tablet by mouth 3 times daily as needed for Nausea or Vomiting  Qty: 21 tablet, Refills: 0      gabapentin (NEURONTIN) 100 MG capsule Take 1 capsule by mouth in the morning and 1 capsule at noon and 1 capsule before bedtime.  Do all this for 10 days. Qty: 30 capsule, Refills: 0           CONTINUE these medications which have NOT CHANGED    Details   aspirin 81 MG EC tablet Take 81 mg by mouth daily      rosuvastatin (CRESTOR) 10 MG tablet Take 10 mg by mouth daily           Activity: activity as tolerated and no heavy lifting for 3 to 4 weeks weeks  Diet: regular diet  Wound Care: Keep wound VAC intact  Follow the discharge instructions. Follow drain care instructions. Follow-up with Dr. Toño Kwan in 10 to 14 days.    Conception MD Kleber 7/22/2022

## 2022-07-22 NOTE — PROGRESS NOTES
Pt alert, oriented, and medically stable for discharge. Pt verbalized understanding of discharge instructions, new medications, side effects as well as follow up appointments. Pt had already obtained all home medications yesterday from out patient pharmacy. IV removed. Pt educated on drain emptying and care as well as monitoring for s/s of infection at incision site and monitoring/care for the provena wound vac. Pt wheeled down to main entrance where sister picked her up.
insulin morning of your surgery. · Wear loose, comfortable clothing that is easy to put on and take off. They will remain in post-op with the nurse. · If you will be returning home the same day as your surgery, you will need to have a responsible adult (25years of age or older) present to drive you home. You will need someone stay with you at home for the first 24 hours following your surgery. This is due to the anesthesia and the medication given to you during surgery and recovery.

## 2022-07-27 ENCOUNTER — PATIENT MESSAGE (OUTPATIENT)
Dept: SURGERY | Age: 67
End: 2022-07-27

## 2022-07-27 NOTE — TELEPHONE ENCOUNTER
From: Chi Wahl  To: Dr. Han Dunbar: 7/27/2022 11:43 AM EDT  Subject: Wound vac    I have my follow up Fri Since Monday my collections have been 20cc for three days Now my wound vac has only one green light left before it shuts off Should I be concerned?

## 2022-07-27 NOTE — TELEPHONE ENCOUNTER
Spoke whit pt, explain the green line means the wound vac will shout off tomorrow is ok to wait until Friday for removal since pt does not feel comfortable removal

## 2022-07-29 ENCOUNTER — OFFICE VISIT (OUTPATIENT)
Dept: SURGERY | Age: 67
End: 2022-07-29

## 2022-07-29 DIAGNOSIS — Z09 POSTOPERATIVE EXAMINATION: Primary | ICD-10-CM

## 2022-07-29 PROCEDURE — 99024 POSTOP FOLLOW-UP VISIT: CPT | Performed by: PLASTIC SURGERY

## 2022-08-01 ENCOUNTER — PATIENT MESSAGE (OUTPATIENT)
Dept: SURGERY | Age: 67
End: 2022-08-01

## 2022-08-01 NOTE — TELEPHONE ENCOUNTER
From: Cecy Valeiro  To: Dr. Jamey Lesch: 8/1/2022 10:58 AM EDT  Subject: Drain/Port    Not knowing I was supposed to be very active which I started after my last visit [address], [address] work yard work grocery etc as of today my output in cc is still between 20-25-30 Not so  Lowing down which we are coming up on 14 days So I wont make my total 30 cc output for three days since my appt is Wed If we are NOT taking the port out do I still need to keep this Wed appt My next one isnt till 29 Aug but not sure when the stitches are to come out?  Thus Wed it on the 19th I dont want to make a needless trip from [place] if the port is to remain in and stitches are not to come out Thank you Frustrated

## 2022-08-02 RX ORDER — PROMETHAZINE HYDROCHLORIDE 25 MG/1
12.5 TABLET ORAL EVERY 6 HOURS PRN
Status: CANCELLED | OUTPATIENT
Start: 2022-08-02

## 2022-08-02 RX ORDER — SODIUM CHLORIDE 9 MG/ML
INJECTION, SOLUTION INTRAVENOUS PRN
Status: CANCELLED | OUTPATIENT
Start: 2022-08-02

## 2022-08-02 RX ORDER — ONDANSETRON 2 MG/ML
4 INJECTION INTRAMUSCULAR; INTRAVENOUS EVERY 6 HOURS PRN
Status: CANCELLED | OUTPATIENT
Start: 2022-08-02

## 2022-08-02 RX ORDER — SODIUM CHLORIDE 0.9 % (FLUSH) 0.9 %
5-40 SYRINGE (ML) INJECTION PRN
Status: CANCELLED | OUTPATIENT
Start: 2022-08-02

## 2022-08-02 RX ORDER — SODIUM CHLORIDE 0.9 % (FLUSH) 0.9 %
5-40 SYRINGE (ML) INJECTION EVERY 12 HOURS SCHEDULED
Status: CANCELLED | OUTPATIENT
Start: 2022-08-02

## 2022-08-05 ENCOUNTER — OFFICE VISIT (OUTPATIENT)
Dept: SURGERY | Age: 67
End: 2022-08-05

## 2022-08-05 VITALS
RESPIRATION RATE: 16 BRPM | OXYGEN SATURATION: 100 % | HEIGHT: 64 IN | WEIGHT: 220 LBS | BODY MASS INDEX: 37.56 KG/M2 | HEART RATE: 78 BPM

## 2022-08-05 DIAGNOSIS — Z09 POSTOPERATIVE EXAMINATION: Primary | ICD-10-CM

## 2022-08-05 PROCEDURE — 99024 POSTOP FOLLOW-UP VISIT: CPT | Performed by: PLASTIC SURGERY

## 2022-08-05 NOTE — PROGRESS NOTES
[address] [facility]  [facility] [id number]       OFFICE POST-OP NOTE    Patient Name:  Lul Peralta    :  1955    MRN:  [id number]  STATUS POST  Chief Complaint   Patient presents with    Post-Op Check     Monsplasty DOS 22       SUBJECTIVE  Patient seen and examined. Patient status post mons plasty on 2022. PHYSICAL EXAM  Vital Signs:  Pulse 78   Resp 16   Ht 5' 4\" (1.626 m)   Wt 220 lb (99.8 kg)   SpO2 100%   BMI 37.76 kg/m²     Incisions:  Suture line clean dry and intact. Skin:  No evidence of infection. Neurologic:  Alert & oriented x 3. Drain is draining serosanguineous fluid. ASSESSMENT   Diagnosis Orders   1. Postoperative examination            PLAN  Continue compression garment  We will remove the drain today  Follow-up in 1 month unless there is any problems or issues follow-up earlier. Plan discussed with patient.     Electronically signed by:  Levy Amin M.D.   2022

## 2023-01-06 ENCOUNTER — PATIENT MESSAGE (OUTPATIENT)
Dept: SURGERY | Age: 68
End: 2023-01-06

## 2023-01-31 ENCOUNTER — PATIENT MESSAGE (OUTPATIENT)
Dept: SURGERY | Age: 68
End: 2023-01-31

## 2023-02-02 NOTE — TELEPHONE ENCOUNTER
Pt state that she will wait until 2/22/23 to discuss concern with Dr. Coreas Southern offer help with my chart pt denied

## 2023-02-02 NOTE — TELEPHONE ENCOUNTER
From: Ellen Roberts  To: Valdez Phlegm  Sent: 1/31/2023 9:32 AM EST  Subject: Picture uploads    Hi Marti, we did not get your uploaded pics that you sent. Are you able to upload to this message?   Thanks  Melanie Trimble

## 2023-03-15 ENCOUNTER — OFFICE VISIT (OUTPATIENT)
Dept: SURGERY | Age: 68
End: 2023-03-15
Payer: COMMERCIAL

## 2023-03-15 VITALS — WEIGHT: 220 LBS | OXYGEN SATURATION: 100 % | BODY MASS INDEX: 37.56 KG/M2 | HEIGHT: 64 IN | RESPIRATION RATE: 16 BRPM

## 2023-03-15 DIAGNOSIS — D48.5 NEOPLASM OF UNCERTAIN BEHAVIOR OF SKIN: Primary | ICD-10-CM

## 2023-03-15 PROCEDURE — 99213 OFFICE O/P EST LOW 20 MIN: CPT | Performed by: PLASTIC SURGERY

## 2023-03-15 PROCEDURE — 1123F ACP DISCUSS/DSCN MKR DOCD: CPT | Performed by: PLASTIC SURGERY

## 2023-03-15 NOTE — PROGRESS NOTES
[address] [facility]  [facility] [address]       History and Physical     Chief Complaint   Patient presents with    Follow-up     Skin lesion chin        HPI:   Josefina Juarez is a 78 y.o. female who presents discussed multiple skin lesion. Patient has a personal history of actinic keratosis in the past.  Patient has new lesion on the right chin that has been increasing in size and has been bleeding and has been causing pain and discomfort. Patient also has a lesion/cyst on left side inframammary fold. This has been causing pain and discomfort specially when the bra rubs on the area. Medications:     Current Outpatient Medications   Medication Sig Dispense Refill    ondansetron (ZOFRAN-ODT) 4 MG disintegrating tablet Take 1 tablet by mouth 3 times daily as needed for Nausea or Vomiting 21 tablet 0    gabapentin (NEURONTIN) 100 MG capsule Take 1 capsule by mouth in the morning and 1 capsule at noon and 1 capsule before bedtime. Do all this for 10 days. 30 capsule 0    aspirin 81 MG EC tablet Take 81 mg by mouth daily      rosuvastatin (CRESTOR) 10 MG tablet Take 10 mg by mouth daily       No current facility-administered medications for this visit. Allergies: Allergies   Allergen Reactions    Fd&C Blue #1 (Brilliant Blue Fcf) Anaphylaxis     Non-ionic dye    Iodides Anaphylaxis    Iodine Anaphylaxis     Review of Systems:   Constitutional: Negative for fever, chills, fatigue and unexpected weight change. HENT: Negative for hearing loss, sore throat and facial swelling. Eyes: Negative for pain and discharge. Respiratory: Negative for cough and shortness of breath. Cardiovascular: Negative for chest pain. Gastrointestinal: Negative for nausea, vomiting, diarrhea and constipation. Skin: History of irritation. .   Neurological: Negative for seizures, syncope and numbness. Hematological: Does not bruise/bleed easily. Psychiatric/Behavioral: Negative for behavioral problems. The patient is not nervous/anxious. Past Medical History:   Diagnosis Date    Abdominal pannus     CAD (coronary artery disease)     Cancer (HCC)     bcc skin/ multiple sites/ multiple excisions    Dyslipidemia     GERD (gastroesophageal reflux disease)     Glaucoma     Hyperlipidemia     Kidney stones     Lumbar radiculopathy     Obesity     PONV (postoperative nausea and vomiting)     Vitamin D deficiency      Past Surgical History:   Procedure Laterality Date    ABDOMINOPLASTY      ABDOMINOPLASTY N/A 2022    MONSPLASTY WITH INCISIONAL WOUND VAC performed by Moraima Nielsen MD at [address] [place] [address] Right     CATARACT REMOVAL WITH IMPLANT Bilateral ,    COLONOSCOPY      COSMETIC SURGERY      CYSTOSCOPY  ,    EYE SURGERY Right 2017    retinal detached    LIPECTOMY      LIPECTOMY      abdomen    LITHOTRIPSY  ,2012    x3 laser of stones/ stent placed    SKIN BIOPSY  2012    neck bcc    SKIN BIOPSY  2010    x2 neck BCC    SKIN BIOPSY      SCC/ actinic keratosis, cheek, forehead    SKIN BIOPSY  2013    [facility] rt temporal    SKIN BIOPSY  10/2017     rt breast /left shoulder skin cancers    WOUND VACUUM APPLICATION      MONSPLASTY WITH INCISIONAL WOUND VAC (Abdomen)     Social History     Socioeconomic History    Marital status:       Spouse name: Not on file    Number of children: Not on file    Years of education: Not on file    Highest education level: Not on file   Occupational History    Not on file   Tobacco Use    Smoking status: Former     Packs/day: 0.25     Years: 1.00     Pack years: 0.25     Types: Cigarettes     Start date: 1977     Quit date: 1977     Years since quittin.3    Smokeless tobacco: Never   Vaping Use    Vaping Use: Never used   Substance and Sexual Activity    Alcohol use: Yes     Comment: social    Drug use: Not Currently    Sexual activity: Not on file   Other Topics Concern    Not on file   Social History Narrative    Not on file     Social Determinants of Health     Financial Resource Strain: Not on file   Food Insecurity: Not on file   Transportation Needs: Not on file   Physical Activity: Not on file   Stress: Not on file   Social Connections: Not on file   Intimate Partner Violence: Not on file   Housing Stability: Not on file     No family history on file. Physical Exam:   Resp 16   Ht 5' 4\" (1.626 m)   Wt 220 lb (99.8 kg)   SpO2 100%   BMI 37.76 kg/m²    Body mass index is 37.76 kg/m². Physical Exam   Nursing note and vitals reviewed. Constitutional: Oriented to person, place, and time. Appears well-developed and well-nourished. No distress. Head: Normocephalic and atraumatic. Eyes: Conjunctivae and EOM are normal.   Pulmonary/Chest: Effort normal. No respiratory distress. Neurological: Alert and oriented to person, place, and time. Psychiatric: Normal mood and affect. Behavior is normal    Skin:       Right lower back probably consistent with a Tatar keratosis we will observe this    Left chest/IMF cyst that is causing pain and discomfort we will plan excision    Right chin lesion that has been changing and increasing in size we will plan resection    Right shoulder lesion probably consistent with actinic keratosis. We will observe this          Impression/Plan:      Diagnosis Orders   1. Neoplasm of uncertain behavior of skin            Patient Active Problem List   Diagnosis    Cutaneous lipodystrophy     Plan:  Patient with a lesion on the right chin and a cyst on the left chest at the inframammary fold. We discussed excision of these 2 lesion. Risks were discussed with patient detail all questions were answered. The following information was discussed with the patient, but this is not an all-inclusive-other issue were also discussed with patient.   GENERAL INFORMATION  The surgical removal of skin lesions and tumors is frequently performed by plastic surgeons. Certain skin lesions and skin tumors will not disappear spontaneously, surgical removal is a treatment option. There are many different techniques for removing skin lesions and skin tumors. ALTERNATIVE TREATMENTS  Alternative forms of management consist of not treating the skin lesion/skin tumor condition. Removal of skin lesions and some superficial skin tumors may be accomplished by other treatments including the use of liquid nitrogen (freezing), lasers, topical medications, and electric cautery. Risks and potential complications are associated with alternative forms of treatment. Deeper tumors cannot be treated by this. RISKS OF SKIN LESION/SKIN TUMOR SURGERY    I have explained to the patient that every surgical procedure involves a certain amount of risk and it is important that an understanding of these risks and the possible complications associated with them is understood. In addition, every procedure has limitations. An individual's choice to undergo a surgical procedure is based on the comparison of the risk to potential benefit. Although some of patients do not experience these complications. Bleeding- It is possible, to experience a bleeding episode during or after surgery. Intraoperative blood transfusions may be required. Should post-operative bleeding occur, it may require an emergency treatment to drain the accumulated blood or blood transfusion. Do not take any aspirin or anti-inflammatory medications for ten days before or after surgery, as this may increase the risk of bleeding. Non-prescription herbs and dietary supplements can increase the risk of surgical bleeding. Hematoma can occur at any time following injury. If blood transfusions are necessary to treat blood loss, there is the risk of blood-related infections such as hepatitis and HIV (AIDS).   Heparin medications that are used to prevent blood clots in veins can produce bleeding and decreased blood platelets. Please check with the physician who prescribed that blood thinners such as aspirin Coumadin etc. Prior to stopping them. Infection- Infection can occur after surgery. Should an infection occur, additional treatment including antibiotics, hospitalization, or additional surgery may be necessary. Scarring- All surgery leaves scars, some more visible than others. Although wound healing after a surgical procedure is expected, abnormal scars may occur within the skin and deeper tissues. Scars may be unattractive and of different color than the surrounding skin tone. Scar appearance may also vary within the same scar. There is the possibility of visible marks from sutures used to close the wound after the removal of skin lesions and tumors. There is the possibility that scars may limit motion and function. In some cases, scars may require surgical revision or treatment. Obesity: If you're BMI is greater than 30 you may have a higher chance of complications. This may include but not limited to wound healing and infections. Also, if you have other medical problems such as diabetes and hypertension it may affect your healing as well as your surgical results in bleeding. Damage to Deeper Structures- There is the potential for injury to deeper structures including nerves, blood vessels, muscles, and lungs (pneumothorax) during any surgical procedure. The potential for this to occur varies according to where on the body surgery is being performed. Injury to deeper structures may be temporary or permanent. Cancer- In some situations, a skin lesion or tumor that appears to be benign may be determined to be cancerous after laboratory analysis. Additional treatments or surgery may be necessary. Recurrence- In some situation, skin lesions and tumors can recur after surgical excision. Additional treatment or secondary surgery may be necessary.   Skin Discoloration / Swelling- Some bruising and swelling normally occurs following surgery. The skin in or near the surgical site can appear either lighter or darker than surrounding skin. Although uncommon, swelling and skin discoloration may persist for long periods of time and, in rare situations, may be permanent. Skin Sensitivity- Itching, tenderness, or exaggerated responses to hot or cold temperatures may occur after surgery. Usually this resolves during healing, but in some situations, it may be chronic, permanent. Pain- You will experience pain after your surgery. Pain of varying intensity and duration may occur and persist after surgery. Chronic pain may occur from nerves becoming trapped in scar tissue. Sutures- Some surgical techniques use deep sutures. You may notice these sutures after your surgery. Sutures may spontaneously poke through the skin, become visible or produce irritation that requires removal.  Delayed Healing- Wound disruption or delayed wound healing is possible. Some areas of the skin may not heal normally and may take a long time to heal.  Some areas of skin may die, requiring frequent dressing changes or further surgery to remove the non-healed tissue. Smokers have a greater risk of skin loss and wound healing complications. Skin Contour Irregularities- Contour irregularities and depressions may occur after surgery. Visible and palpable wrinkling of skin can occur. Residual skin irregularities at the ends of the incisions or dog ears are always a possibility and may require additional surgery. This may improve with time, or it can be surgically corrected. Allergic Reactions- In some cases, local allergies to tape, suture materials and glues, blood products, topical preparations or injected agents have been reported. Serious systemic reactions including shock (anaphylaxis) may occur to drugs used during surgery and prescription medications. Allergic reactions may require additional treatment.       Surgical Anesthesia- Both local and general anesthesia involve risk. There is the possibility of complications, injury, and even death from all forms of surgical anesthesia or sedation. Change in Skin Sensation- It is common to experience diminished (or loss) of skin sensation in areas that have had surgery. Diminished (or complete loss of skin sensation) may not totally resolve. Shock- In rare circumstances, your surgical procedure can cause severe trauma, particularly when multiple or extensive procedures are performed. Although serious complications are infrequent, infections or excessive fluid loss can lead to severe illness and even death. If surgical shock occurs, hospitalization and additional treatment would be necessary. Unsatisfactory Result- There is no guarantee or warranty expressed or implied, on the results that may be obtained. There is the possibility of a poor result from the removal of skin lesions and tumors. This would include risks such as unacceptable visible deformities, loss of function, poor healing, wound disruption, skin death and loss of sensation. You may be disappointed with the results of reconstructive surgery. It may be necessary to perform additional surgery to improve your results. Cardiac and Pulmonary Complications- Surgery, especially longer procedures, may be associated with the formation of, or increase in, blood clots in the venous system. Pulmonary complications may occur secondarily to both blood clots (pulmonary emboli), fat deposits (fat emboli) or partial collapse of the lungs after general anesthesia. Pulmonary and fat emboli can be life-threatening or fatal in some circumstances. Air travel, inactivity and other conditions may increase the incidence of blood clots traveling to the lungs causing a major blood clot that may result in death.   It is important to discuss with your physician any past history of blood clots or swollen legs that may contribute to this condition. Cardiac complications are a risk with any surgery and anesthesia, even in patients without symptoms. If you experience shortness of breath, chest pains, or unusual heart beats, seek medical attention immediately. Should any of these complications occur, you may require hospitalization and additional treatment. Smoking, Second-Hand Smoke Exposure, Nicotine Products (Patch, Gum, Nasal Spray)-   Patients who are currently smoking, use tobacco products, or nicotine products (patch, gum, or nasal spray) are at a greater risk for significant surgical complications of skin dying, delayed healing, and additional scarring. Individuals exposed to second-hand smoke are also at potential risk for similar complications attributable to nicotine exposure. Additionally, smoking may have a significant negative effect on anesthesia and recovery from anesthesia, with coughing and possibly increased bleeding. Individuals who are not exposed to tobacco smoke or nicotine-containing products have a significantly lower risk of this type of complication. It is important to refrain from smoking at least 8-week weeks before and after surgery. This may apply to secondhand smoking. Mental Health Disorders and Surgery- It is important that all patients seeking to undergo surgery have realistic expectations that focus on improvement rather than perfection. Complications or less than satisfactory results are sometimes unavoidable, may require additional surgery and often are stressful. Please openly discuss with your surgeon, prior to surgery, any history that you may have of significant emotional depression or mental health disorders. Although many individuals may benefit psychologically from the results of surgery, effects on mental health cannot be accurately predicted. Medications- There are many adverse reactions that occur as the result of taking over the counter, herbal, and/or prescription medications.   Be sure to check with your physician about any drug interactions that may exist with medications which you are already taking. If you have an adverse reaction, stop the drugs immediately and call your plastic surgeon for further instructions. If the reaction is severe, go immediately to the nearest emergency room. When taking the prescribed pain medications after surgery, realize that they can affect your thought process and coordination. Do not drive, do not operate complex equipment, do not make any important decisions, and do not drink any alcohol while taking these medications. Be sure to take your prescribed medication only as directed. ADDITIONAL SURGERY NECESSARY  Should complications occur, additional surgery or other treatments may be necessary. Even though risks and complications occur infrequently, the risks cited are the ones that are particularly associated with skin lesion and skin tumor removal.  Other complications and risks can occur but are even more uncommon. The practice of medicine and surgery is not an exact science. Although good results are expected, there is no guarantee or warranty expressed or implied, on the results that may be obtained. PATIENT COMPLIANCE   Follow all physician instructions carefully; this is essential for the success of your outcome. It is important that the surgical incisions are not subjected to excessive force, swelling, abrasion, or motion during the time of healing. Protective dressings and drains should not be removed unless instructed by your plastic surgeon. Successful post-operative function depends on both surgery and subsequent care. Physical activity that increases your pulse or heart rate may cause bruising, swelling, fluid accumulation and the need for return to surgery. It is wise to refrain from intimate physical activities after surgery until your physician states it is safe.   It is important that you participate in follow-up care, return for aftercare, and promote your recovery after surgery. HEALTH INSURANCE  Most health insurance companies exclude coverage for cosmetic surgical operations or any complications that might occur from surgery. Please carefully review your health insurance subscriber information pamphlet. Most insurance plans exclude coverage for secondary or revisionary surgery. Your type of surgery will most likely be covered by your insurance company, I went there is no guarantees or warrantees implied or otherwise. The cost of surgery involves several charges for the services provided. The total includes fees charged by your surgeon, the cost of surgical supplies, anesthesia, laboratory tests, and possible outpatient hospital charges, depending on where the surgery is performed. Depending on whether the cost of surgery is covered by an insurance plan, you will be responsible for necessary co-payments, deductibles, and charges not covered. The fees charged for this procedure do not include any potential future costs for additional procedures that you elect to have or require in order to revise, optimize, or complete your outcome. Additional costs may occur should complications develop from the surgery. Secondary surgery or hospital day-surgery charges involved with revision surgery will also be your responsibility. II have also explained to the patient that we may obtain photographs. These images or illustration along with my medical records created in my case may be used for obtaining insurance approval, for use in examination, may assist in education, testing, credentialing and or certifying by [facility]. These images and records may be used to communicate with referring physician.    Electronically signed by:  Maggie Bennett MD 3/15/2023

## 2023-03-17 ENCOUNTER — TELEPHONE (OUTPATIENT)
Dept: GASTROENTEROLOGY | Age: 68
End: 2023-03-17

## 2023-03-17 NOTE — TELEPHONE ENCOUNTER
Surgery Scheduled/[facility]  Ex lesion chin - complex closure  Ex lesion LT chest wall - complex closure  4/28/23   9:00 am   [place]  [place]    Patient advised by phone/mail.

## 2023-09-07 ENCOUNTER — TELEPHONE (OUTPATIENT)
Dept: SURGERY | Age: 68
End: 2023-09-07

## 2023-09-07 NOTE — TELEPHONE ENCOUNTER
9/7/23- Patient called to reschedule surgery. Rescheduled to 11/7/23 at 12:30pm, arrival time is 10:30am. Patient confirmed.

## 2023-10-13 ENCOUNTER — TELEPHONE (OUTPATIENT)
Dept: SURGERY | Age: 68
End: 2023-10-13

## 2023-10-13 NOTE — TELEPHONE ENCOUNTER
10/13/23-I spoke with patient about moving her post op appt up since her surgery has been moved up to 10/19. She said she is going to be going to [place] in Nov to help her Dad and really isn't sure what other day she can do. She mentioned maybe even waiting until March, saying she has dealt with this a few years and it was not a problem. I told her I would talk to Dr Efe Rosario about options.  Pt was in agreement

## 2023-10-19 ENCOUNTER — TELEPHONE (OUTPATIENT)
Dept: SURGERY | Age: 68
End: 2023-10-19

## 2023-10-19 NOTE — TELEPHONE ENCOUNTER
10/19/23-Patient called back and requested to have surgery next year.  I have made updates and will send info thru mychart      Surg appt 3/12/24 @ 9am  Arrival time 7am  PAT  phone call on 2/27 @ 10:30am  Post op  3/20/24 @ 8:45am

## 2024-07-30 ENCOUNTER — PATIENT MESSAGE (OUTPATIENT)
Dept: SURGERY | Age: 69
End: 2024-07-30

## 2024-07-30 RX ORDER — VANCOMYCIN HYDROCHLORIDE 250 MG/1
250 CAPSULE ORAL 4 TIMES DAILY
Qty: 40 CAPSULE | Refills: 0 | Status: CANCELLED | OUTPATIENT
Start: 2024-07-30 | End: 2024-08-09

## 2024-07-30 NOTE — TELEPHONE ENCOUNTER
From: Shannon Turcios  To: Dr. Maylin Steele  Sent: 7/30/2024 6:51 AM EDT  Subject: Cancer face cream     I am out of the cream I have that when used on my face reacts to pre ketosis areas . Thank you

## 2024-08-01 RX ORDER — FLUOROURACIL 50 MG/G
CREAM TOPICAL
Qty: 40 G | Refills: 0 | Status: SHIPPED | OUTPATIENT
Start: 2024-08-01

## 2025-01-29 ENCOUNTER — TELEPHONE (OUTPATIENT)
Dept: ADMINISTRATIVE | Age: 70
End: 2025-01-29

## 2025-01-29 NOTE — TELEPHONE ENCOUNTER
Patient is scheduled 2/5/2025 at 11:30     She is wanting to know if she should start using the cream fluorouracil (efudex) 5% cream    So that the issue shows up for the appt since it keep happening on and off when she comes in

## 2025-01-30 ENCOUNTER — PATIENT MESSAGE (OUTPATIENT)
Dept: SURGERY | Age: 70
End: 2025-01-30

## 2025-02-05 ENCOUNTER — TELEMEDICINE (OUTPATIENT)
Dept: SURGERY | Age: 70
End: 2025-02-05
Payer: COMMERCIAL

## 2025-02-05 DIAGNOSIS — D48.5 NEOPLASM OF UNCERTAIN BEHAVIOR OF SKIN: Primary | ICD-10-CM

## 2025-02-05 PROCEDURE — 99213 OFFICE O/P EST LOW 20 MIN: CPT | Performed by: PLASTIC SURGERY

## 2025-02-05 PROCEDURE — 1123F ACP DISCUSS/DSCN MKR DOCD: CPT | Performed by: PLASTIC SURGERY

## 2025-02-05 RX ORDER — FLUOROURACIL 50 MG/G
CREAM TOPICAL
Qty: 40 G | Refills: 0 | Status: SHIPPED | OUTPATIENT
Start: 2025-02-05

## 2025-02-05 RX ORDER — FLUOROURACIL 50 MG/G
CREAM TOPICAL
Qty: 40 G | Refills: 0 | OUTPATIENT
Start: 2025-02-05

## 2025-02-05 NOTE — PROGRESS NOTES
[facility]  [facility]  [address] [place] [address]       History and Physical     No chief complaint on file.       HPI:   Shannon Turcios is a 69 y.o. female who presents discussed multiple skin lesion.  Patient has a personal history of actinic keratosis in the past.  Patient has new lesion on the right chin that has been increasing in size and has been bleeding and has been causing pain and discomfort.  Patient also has a lesion/cyst on left side inframammary fold.  This has been causing pain and discomfort specially when the bra rubs on the area.  Medications:     Current Outpatient Medications   Medication Sig Dispense Refill    fluorouracil (EFUDEX) 5 % cream Apply topically 2 times daily. 40 g 0    aspirin 81 MG chewable tablet Take 1 tablet by mouth daily       No current facility-administered medications for this visit.      Allergies:     Allergies   Allergen Reactions    Fd&C Blue #1 (Brilliant Blue) Anaphylaxis     Non-ionic dye    Iodides Anaphylaxis    Iodine Anaphylaxis     Review of Systems:   Constitutional: Negative for fever, chills, fatigue and unexpected weight change.   HENT: Negative for hearing loss, sore throat and facial swelling.    Eyes: Negative for pain and discharge.   Respiratory: Negative for cough and shortness of breath.    Cardiovascular: Negative for chest pain.   Gastrointestinal: Negative for nausea, vomiting, diarrhea and constipation.   Skin: History of irritation..   Neurological: Negative for seizures, syncope and numbness.   Hematological: Does not bruise/bleed easily.   Psychiatric/Behavioral: Negative for behavioral problems. The patient is not nervous/anxious.      Past Medical History:   Diagnosis Date    Abdominal pannus     CAD (coronary artery disease) 2023    2 stents placed. Dr Cardenas    Cancer (HCC)     bcc skin/ multiple sites/ multiple excisions    Dyslipidemia     GERD (gastroesophageal reflux disease)

## 2025-06-30 ENCOUNTER — PATIENT MESSAGE (OUTPATIENT)
Dept: SURGERY | Age: 70
End: 2025-06-30

## 2025-06-30 NOTE — TELEPHONE ENCOUNTER
Spoke to patient, the two lesions on her right and left temple are scaling and bleeding. The lesion on her right lower chin is also bleeding then scabs over and bleeds again. Patient would like to know if she can have the areas removed or if she needs an appointment prior to scheduling surgery. She prefers to have surgery scheduled before Labor Day.

## 2025-07-01 ENCOUNTER — TELEPHONE (OUTPATIENT)
Dept: SURGERY | Age: 70
End: 2025-07-01

## 2025-07-01 ENCOUNTER — PREP FOR PROCEDURE (OUTPATIENT)
Dept: SURGERY | Age: 70
End: 2025-07-01

## 2025-07-01 DIAGNOSIS — D48.5 NEOPLASM OF UNCERTAIN BEHAVIOR OF SKIN: ICD-10-CM

## 2025-07-01 NOTE — TELEPHONE ENCOUNTER
Per Dr. Steele, patient can have lesions excised. Patient will be scheduled for surgery on a Friday and have a clinic visit the Wednesday prior to surgery. Surgery scheduling will reach out to patient to schedule surgery and clinic visit.

## 2025-07-01 NOTE — TELEPHONE ENCOUNTER
Spoke to patient, surgery scheduled at [facility]. Patient confirmed and information sent to patient(s) [facility].    Surgery date: 8/8/2025  Surgery time: 12:30pm  Arrival time: 10:30am    PAT is via phone call  PAT date: 7/25/2025  PAT time: 9:00am    Post op date: 8/20/2025  Post op time: 10:30am

## 2025-07-25 ENCOUNTER — HOSPITAL ENCOUNTER (OUTPATIENT)
Dept: PREADMISSION TESTING | Age: 70
Discharge: HOME | End: 2025-07-29

## 2025-07-25 VITALS — BODY MASS INDEX: 37.56 KG/M2 | HEIGHT: 64 IN | WEIGHT: 220 LBS

## 2025-07-25 NOTE — PRE-PROCEDURE INSTRUCTIONS
ARRIVE AT THE HOSPITAL ON Friday, August 8,2025 at 11:30 AM    Once you enter the hospital lobby, take the elevators to the second floor.  Check-In is at the surgery registration desk.      Continue to take your home medications as you normally do up to and including the night before surgery with the exception of any blood thinning medications.    Please stop any blood thinning medications as directed by your surgeon or prescribing physician. Failure to stop certain medications may interfere with your scheduled surgery.    These may include:  Aspirin, Warfarin (Coumadin), Clopidogrel (Plavix), Ibuprofen (Motrin, Advil), Naproxen (Aleve), Meloxicam (Mobic), Celecoxib (Celebrex), Eliquis, Pradaxa, Xarelto, Effient, Fish Oil, Herbal supplements.     Check with Cardiology when aspirin needs to stop before surgery           Please take the following medication(s) the day of surgery with a small sip of water:  none        PREPARING FOR YOUR SURGERY:     Before surgery, you can play an important role in your own health. Because skin is not sterile, we need to be sure that your skin is as free of germs as possible before surgery by carefully washing before surgery.  Preparing or “prepping” skin before surgery can reduce the risk of a “surgical site infection.”  Do not shave the area of your body where your surgery will be performed unless you received specific permission from your physician.    Preoperative Bathing Instructions  Bathe or shower the day of surgery.  Wear clean clothing after bathing.  Shampoo hair before surgery  Keep nails clean   Do not apply alcohol-based hair or skin products,   Do not apply lotion, emollients, cosmetics, perfumes or deodorant the day of surgery.  Do not shave the area of your body where your surgery will be performed unless you receive specific permission from your surgeon.       Patient Instructions:    Bring your eyeglasses and case with you.  No contacts are to be worn the day of

## 2025-08-06 ENCOUNTER — OFFICE VISIT (OUTPATIENT)
Dept: SURGERY | Age: 70
End: 2025-08-06
Payer: COMMERCIAL

## 2025-08-06 VITALS
DIASTOLIC BLOOD PRESSURE: 78 MMHG | HEIGHT: 64 IN | OXYGEN SATURATION: 99 % | SYSTOLIC BLOOD PRESSURE: 148 MMHG | HEART RATE: 64 BPM | BODY MASS INDEX: 39.27 KG/M2 | WEIGHT: 230 LBS

## 2025-08-06 DIAGNOSIS — D48.5 NEOPLASM OF UNCERTAIN BEHAVIOR OF SKIN: Primary | ICD-10-CM

## 2025-08-06 PROCEDURE — 99213 OFFICE O/P EST LOW 20 MIN: CPT | Performed by: PLASTIC SURGERY

## 2025-08-06 PROCEDURE — 1123F ACP DISCUSS/DSCN MKR DOCD: CPT | Performed by: PLASTIC SURGERY

## 2025-08-06 RX ORDER — TRAZODONE HYDROCHLORIDE 50 MG/1
50 TABLET ORAL NIGHTLY PRN
COMMUNITY
Start: 2025-05-15

## 2025-08-06 RX ORDER — OMEGA-3 FATTY ACIDS/FISH OIL 300-1000MG
1 CAPSULE ORAL EVERY 8 HOURS PRN
COMMUNITY

## 2025-08-08 ENCOUNTER — HOSPITAL ENCOUNTER (OUTPATIENT)
Age: 70
Setting detail: OUTPATIENT SURGERY
Discharge: HOME | End: 2025-08-08
Attending: PLASTIC SURGERY | Admitting: PLASTIC SURGERY
Payer: COMMERCIAL

## 2025-08-08 VITALS
DIASTOLIC BLOOD PRESSURE: 84 MMHG | SYSTOLIC BLOOD PRESSURE: 155 MMHG | TEMPERATURE: 97.7 F | HEART RATE: 83 BPM | OXYGEN SATURATION: 98 % | HEIGHT: 64 IN | WEIGHT: 220 LBS | BODY MASS INDEX: 37.56 KG/M2

## 2025-08-08 DIAGNOSIS — D48.5 NEOPLASM OF UNCERTAIN BEHAVIOR OF SKIN: ICD-10-CM

## 2025-08-08 PROCEDURE — 7100000011 HC PHASE II RECOVERY - ADDTL 15 MIN: Performed by: PLASTIC SURGERY

## 2025-08-08 PROCEDURE — 6360000002 HC RX W HCPCS: Performed by: PLASTIC SURGERY

## 2025-08-08 PROCEDURE — 3600000012 HC SURGERY LEVEL 2 ADDTL 15MIN: Performed by: PLASTIC SURGERY

## 2025-08-08 PROCEDURE — 88305 TISSUE EXAM BY PATHOLOGIST: CPT

## 2025-08-08 PROCEDURE — 11644 EXC F/E/E/N/L MAL+MRG 3.1-4: CPT | Performed by: PLASTIC SURGERY

## 2025-08-08 PROCEDURE — 11403 EXC TR-EXT B9+MARG 2.1-3CM: CPT | Performed by: PLASTIC SURGERY

## 2025-08-08 PROCEDURE — 11641 EXC F/E/E/N/L MAL+MRG 0.6-1: CPT | Performed by: PLASTIC SURGERY

## 2025-08-08 PROCEDURE — 11442 EXC FACE-MM B9+MARG 1.1-2 CM: CPT | Performed by: PLASTIC SURGERY

## 2025-08-08 PROCEDURE — 3600000002 HC SURGERY LEVEL 2 BASE: Performed by: PLASTIC SURGERY

## 2025-08-08 PROCEDURE — 13132 CMPLX RPR F/C/C/M/N/AX/G/H/F: CPT | Performed by: PLASTIC SURGERY

## 2025-08-08 PROCEDURE — 13101 CMPLX RPR TRUNK 2.6-7.5 CM: CPT | Performed by: PLASTIC SURGERY

## 2025-08-08 PROCEDURE — 13133 CMPLX RPR F/C/C/M/N/AX/G/H/F: CPT | Performed by: PLASTIC SURGERY

## 2025-08-08 PROCEDURE — 11402 EXC TR-EXT B9+MARG 1.1-2 CM: CPT | Performed by: PLASTIC SURGERY

## 2025-08-08 PROCEDURE — 2709999900 HC NON-CHARGEABLE SUPPLY: Performed by: PLASTIC SURGERY

## 2025-08-08 PROCEDURE — 7100000010 HC PHASE II RECOVERY - FIRST 15 MIN: Performed by: PLASTIC SURGERY

## 2025-08-08 PROCEDURE — 19120 REMOVAL OF BREAST LESION: CPT | Performed by: PLASTIC SURGERY

## 2025-08-08 PROCEDURE — 13102 CMPLX RPR TRUNK ADDL 5CM/<: CPT | Performed by: PLASTIC SURGERY

## 2025-08-08 RX ORDER — TRAMADOL HYDROCHLORIDE 50 MG/1
50 TABLET ORAL EVERY 4 HOURS PRN
Qty: 18 TABLET | Refills: 0 | Status: SHIPPED | OUTPATIENT
Start: 2025-08-08 | End: 2025-08-11

## 2025-08-08 RX ORDER — LIDOCAINE HYDROCHLORIDE AND EPINEPHRINE 10; 10 MG/ML; UG/ML
INJECTION, SOLUTION INFILTRATION; PERINEURAL PRN
Status: DISCONTINUED | OUTPATIENT
Start: 2025-08-08 | End: 2025-08-08 | Stop reason: ALTCHOICE

## 2025-08-08 RX ORDER — CIPROFLOXACIN 500 MG/1
500 TABLET, FILM COATED ORAL 2 TIMES DAILY
Qty: 20 TABLET | Refills: 0 | Status: SHIPPED | OUTPATIENT
Start: 2025-08-08 | End: 2025-08-18

## 2025-08-08 ASSESSMENT — PULMONARY FUNCTION TESTS
PIF_VALUE: 0
PIF_VALUE: 0

## 2025-08-08 ASSESSMENT — PAIN - FUNCTIONAL ASSESSMENT: PAIN_FUNCTIONAL_ASSESSMENT: 0-10

## 2025-08-14 LAB — SURGICAL PATHOLOGY REPORT: NORMAL

## 2025-08-20 ENCOUNTER — OFFICE VISIT (OUTPATIENT)
Dept: SURGERY | Age: 70
End: 2025-08-20

## 2025-08-20 ENCOUNTER — TELEPHONE (OUTPATIENT)
Dept: SURGERY | Age: 70
End: 2025-08-20

## 2025-08-20 VITALS — DIASTOLIC BLOOD PRESSURE: 74 MMHG | BODY MASS INDEX: 37.76 KG/M2 | HEIGHT: 64 IN | SYSTOLIC BLOOD PRESSURE: 118 MMHG

## 2025-08-20 DIAGNOSIS — Z98.890 POSTOPERATIVE STATE: Primary | ICD-10-CM

## 2025-08-20 PROCEDURE — 99024 POSTOP FOLLOW-UP VISIT: CPT | Performed by: PLASTIC SURGERY

## 2025-08-27 ENCOUNTER — OFFICE VISIT (OUTPATIENT)
Dept: SURGERY | Age: 70
End: 2025-08-27

## 2025-08-27 VITALS
SYSTOLIC BLOOD PRESSURE: 159 MMHG | DIASTOLIC BLOOD PRESSURE: 88 MMHG | BODY MASS INDEX: 37.76 KG/M2 | HEIGHT: 64 IN | HEART RATE: 62 BPM

## 2025-08-27 DIAGNOSIS — Z98.890 POSTOPERATIVE STATE: Primary | ICD-10-CM

## 2025-08-27 PROCEDURE — 99024 POSTOP FOLLOW-UP VISIT: CPT | Performed by: PLASTIC SURGERY

## 2025-08-27 RX ORDER — IMIQUIMOD 12.5 MG/.25G
CREAM TOPICAL
Qty: 24 EACH | Refills: 3 | OUTPATIENT
Start: 2025-08-27 | End: 2025-09-03

## 2025-08-27 RX ORDER — IBUPROFEN 800 MG/1
800 TABLET, FILM COATED ORAL EVERY 8 HOURS PRN
COMMUNITY
Start: 2025-03-20

## 2025-08-28 ENCOUNTER — TELEPHONE (OUTPATIENT)
Dept: SURGERY | Age: 70
End: 2025-08-28

## 2025-08-28 DIAGNOSIS — D48.5 NEOPLASM OF UNCERTAIN BEHAVIOR OF SKIN: Primary | ICD-10-CM

## 2025-08-28 RX ORDER — IMIQUIMOD 12.5 MG/.25G
CREAM TOPICAL
Qty: 24 EACH | Refills: 4 | Status: SHIPPED | OUTPATIENT
Start: 2025-08-28 | End: 2025-09-04

## (undated) DEVICE — SUTURE VICRYL + SZ 3-0 L27IN ABSRB UD L26MM SH 1/2 CIR VCP416H

## (undated) DEVICE — SUTURE MONOCRYL STRATAFIX SPRL + 3 0 SGL ARMED PS 1 24 IN LEN SXMP1B103

## (undated) DEVICE — BLADE SURG 15 TWIN BK CARBON STL STRL CISION LF DISP

## (undated) DEVICE — ELECTRODE PT RET AD L9FT HI MOIST COND ADH HYDRGEL CORDED

## (undated) DEVICE — RESERVOIR,SUCTION,100CC,SILICONE: Brand: MEDLINE

## (undated) DEVICE — DRAPE,REIN 53X77,STERILE: Brand: MEDLINE

## (undated) DEVICE — SUTURE PROL SZ 5-0 L18IN NONABSORBABLE BLU L16MM PC-3 3/8 8635G

## (undated) DEVICE — Device

## (undated) DEVICE — DRESSING GERM 6-12FR DIA1IN CNTR H 4MM ANTIMIC PROTCT DISK

## (undated) DEVICE — SPONGE DRN W4XL4IN RAYON/POLYESTER 6 PLY NONWOVEN PRECUT

## (undated) DEVICE — MANIFOLD SUCT 4 PRT 2 CANSTR FLTR DISP NEPTUNE 2

## (undated) DEVICE — SOLUTION IRRIG 1000ML 09% SOD CHL USP PIC PLAS CONTAINER

## (undated) DEVICE — E-Z CLEAN, NON-STICK, PTFE COATED, ELECTROSURGICAL BLADE ELECTRODE, 2.5 INCH (6.35 CM): Brand: EZ CLEAN

## (undated) DEVICE — SUTURE VICRYL SZ 4-0 L27IN ABSRB UD L26MM SH 1/2 CIR J415H

## (undated) DEVICE — 3M™ TEGADERM™ I.V. ADVANCED SECUREMENT DRESSING, 1683, 2-1/2 IN X 2-3/4 IN, 6.5 CM X 7 CM, 100/CT 4CT/CASE: Brand: 3M™ TEGADERM™

## (undated) DEVICE — SUTURE PROL SZ 2-0 L18IN NONABSORBABLE BLU FS L26MM 3/8 CIR 8685H

## (undated) DEVICE — DRAPE ORTH W77XL108IN POLYPR SMS SHT SPL STD ABSRB REINF

## (undated) DEVICE — SUTURE ETHLN SZ 5-0 L18IN NONABSORBABLE BLK P-3 L13MM 3/8 698G

## (undated) DEVICE — MASTISOL ADHESIVE AMPULE

## (undated) DEVICE — MHPB GEN MINOR PACK: Brand: MEDLINE INDUSTRIES, INC.

## (undated) DEVICE — SUTURE ABSORB MONOFILAMENT 3-0 RB 1 6IN STRATAFIX SPRL SXMP2B402

## (undated) DEVICE — DRAPE SURG W15XL26IN 100% POLYPR SMS FAB W/ TAPE STD UTIL

## (undated) DEVICE — GLOVE SURG SZ 6 L12IN THK75MIL DK GRN LTX FREE

## (undated) DEVICE — 3M™ PRECISE™ VISTA DISPOSABLE SKIN STAPLER 3995: Brand: 3M™ PRECISE™

## (undated) DEVICE — KIT NEG PRSS FOR NONLIN OR UPTO 90CM LIN INCIS PREVENA +

## (undated) DEVICE — PAD,ABDOMINAL,5"X9",ST,LF,25/BX: Brand: MEDLINE INDUSTRIES, INC.

## (undated) DEVICE — 3M™ WARMING BLANKET, UPPER BODY, 10 PER CASE, 42268: Brand: BAIR HUGGER™

## (undated) DEVICE — BLADE ES ELASTOMERIC COAT INSUL DURABLE BEND UPTO 90DEG

## (undated) DEVICE — GLOVE SURG SZ 6 L11.3IN FNGR THK7.9MIL CUF THK5.5MIL BLU

## (undated) DEVICE — SUTURE PDS II SZ 0 L60IN ABSRB VLT L65MM TP-1 1/2 CIR Z991G

## (undated) DEVICE — APPLICATOR MEDICATED 26 CC SOLUTION HI LT ORNG CHLORAPREP

## (undated) DEVICE — 3M™ STERI-STRIP™ REINFORCED ADHESIVE SKIN CLOSURES, R1547, 1/2 IN X 4 IN (12 MM X 100 MM), 6 STRIPS/ENVELOPE: Brand: 3M™ STERI-STRIP™

## (undated) DEVICE — 3M™ IOBAN™ 2 ANTIMICROBIAL INCISE DRAPE 6650EZ: Brand: IOBAN™ 2

## (undated) DEVICE — SOLUTION IV 1000ML LAC RINGERS PH 6.5 INJ USP VIAFLX PLAS

## (undated) DEVICE — 1010 S-DRAPE TOWEL DRAPE 10/BX: Brand: STERI-DRAPE™

## (undated) DEVICE — 2DSM24 2-0 UND MONODERM 30X30: Brand: 2DSM24 2-0 UND MONODERM 30X30

## (undated) DEVICE — AEGIS 1" DISK 4MM HOLE, PEEL OPEN: Brand: MEDLINE

## (undated) DEVICE — TOWEL,OR,DSP,ST,BLUE,DLX,4/PK,20PK/CS: Brand: MEDLINE

## (undated) DEVICE — STRAP,POSITIONING,KNEE/BODY,FOAM,4X60": Brand: MEDLINE

## (undated) DEVICE — SUTURE VICRYL 5-0 L18IN ABSRB UD PS-2 L19MM 1/2 CIR J495H

## (undated) DEVICE — BETADINE 5% EYE SOL

## (undated) DEVICE — PENCIL ES L3M BTTN SWCH HOLSTER W/ BLDE ELECTRD EDGE

## (undated) DEVICE — TRAY SKIN SCRUB W/4 COMPARTMENT

## (undated) DEVICE — COUNTER NDL 40 COUNT HLD 70 FOAM BLK ADH W/ MAG

## (undated) DEVICE — SUTURE VCRL SZ 3-0 L27IN ABSRB UD L26MM SH 1/2 CIR J416H

## (undated) DEVICE — AGENT HEMSTAT 3GM OXIDIZED REGENERATED CELOS ABSRB FOR CONT (ORDER MULTIPLES OF 5EA)

## (undated) DEVICE — DRAIN SURG 19FR 100% SIL RADPQ RND CHN FULL FLUT

## (undated) DEVICE — INTENDED FOR TISSUE SEPARATION, AND OTHER PROCEDURES THAT REQUIRE A SHARP SURGICAL BLADE TO PUNCTURE OR CUT.: Brand: BARD-PARKER ® CARBON RIB-BACK BLADES

## (undated) DEVICE — SOLUTION IV IRRIG POUR BRL 0.9% SODIUM CHL 2F7124

## (undated) DEVICE — SUTURE PROL SZ 4-0 L18IN NONABSORBABLE BLU L19MM PS-2 3/8 8682G

## (undated) DEVICE — MARKER SURG SKIN GENTIAN VLT REG TIP W/ 6IN RUL AND BLNK DYNJSM02

## (undated) DEVICE — DECANTER BAG 9": Brand: MEDLINE INDUSTRIES, INC.

## (undated) DEVICE — SPONGE LAP W18XL18IN WHT COT 4 PLY FLD STRUNG RADPQ DISP ST

## (undated) DEVICE — SUTURE VCRL + SZ 2-0 L27IN ABSRB CLR CT-1 1/2 CIR TAPERCUT VCP259H

## (undated) DEVICE — DRESSING,GAUZE,XEROFORM,CURAD,5"X9",ST: Brand: CURAD

## (undated) DEVICE — SHEET, ORTHO, SPLIT, STERILE: Brand: MEDLINE

## (undated) DEVICE — INTENDED FOR TISSUE SEPARATION, AND OTHER PROCEDURES THAT REQUIRE A SHARP SURGICAL BLADE TO PUNCTURE OR CUT.: Brand: BARD-PARKER ® STAINLESS STEEL BLADES

## (undated) DEVICE — CONTAINER,SPECIMEN,4OZ,OR STRL: Brand: MEDLINE

## (undated) DEVICE — 3M™ STERI-DRAPE™  POUCH WITH IOBAN™ 2 INCISE FILM 6657: Brand: STERI-DRAPE™ IOBAN™ 2

## (undated) DEVICE — CONTAINER SPEC 4OZ POLYPR GRAD SCR LID LEAK RESIST ID LBL